# Patient Record
Sex: FEMALE | NOT HISPANIC OR LATINO | Employment: OTHER | URBAN - METROPOLITAN AREA
[De-identification: names, ages, dates, MRNs, and addresses within clinical notes are randomized per-mention and may not be internally consistent; named-entity substitution may affect disease eponyms.]

---

## 2019-01-02 ENCOUNTER — OFFICE VISIT CONVERTED (OUTPATIENT)
Dept: GASTROENTEROLOGY | Facility: CLINIC | Age: 69
End: 2019-01-02
Attending: NURSE PRACTITIONER

## 2019-01-02 ENCOUNTER — CONVERSION ENCOUNTER (OUTPATIENT)
Dept: GASTROENTEROLOGY | Facility: CLINIC | Age: 69
End: 2019-01-02

## 2021-05-15 VITALS
DIASTOLIC BLOOD PRESSURE: 63 MMHG | HEART RATE: 66 BPM | BODY MASS INDEX: 25.55 KG/M2 | SYSTOLIC BLOOD PRESSURE: 97 MMHG | HEIGHT: 66 IN | WEIGHT: 159 LBS | TEMPERATURE: 97.4 F

## 2024-01-05 ENCOUNTER — TELEPHONE (OUTPATIENT)
Dept: CARDIOLOGY | Facility: CLINIC | Age: 74
End: 2024-01-05

## 2024-01-05 NOTE — TELEPHONE ENCOUNTER
Caller: Michelle Cardona    Relationship: Self    Best call back number: [phone] (home)     What is the best time to reach you: ANY    Who are you requesting to speak with (clinical staff, provider,  specific staff member): ANY    Do you know the name of the person who called: NO MESSAGE WAS LEFT    What was the call regarding: PATIENT MISSED A CALL WITH NO VM, WANTED TO MAKE SURE HER APPOINTMENT WAS ALL SET, INFORMED HER IT WAS STILL FOR 1.9.24. PLEASE CALL HER BACK IF THERE WAS ANYTHING NEEDED BEFORE HER APPOINTMENT    Is it okay if the provider responds through MyChart: PLEASE CALL

## 2024-01-15 ENCOUNTER — OFFICE VISIT (OUTPATIENT)
Dept: CARDIOLOGY | Facility: CLINIC | Age: 74
End: 2024-01-15
Payer: MEDICARE

## 2024-01-15 VITALS
SYSTOLIC BLOOD PRESSURE: 128 MMHG | DIASTOLIC BLOOD PRESSURE: 72 MMHG | WEIGHT: 156 LBS | HEART RATE: 75 BPM | HEIGHT: 66 IN | BODY MASS INDEX: 25.07 KG/M2

## 2024-01-15 DIAGNOSIS — R01.1 HEART MURMUR: Primary | ICD-10-CM

## 2024-01-15 PROCEDURE — 99204 OFFICE O/P NEW MOD 45 MIN: CPT | Performed by: INTERNAL MEDICINE

## 2024-01-15 PROCEDURE — 1159F MED LIST DOCD IN RCRD: CPT | Performed by: INTERNAL MEDICINE

## 2024-01-15 PROCEDURE — 1160F RVW MEDS BY RX/DR IN RCRD: CPT | Performed by: INTERNAL MEDICINE

## 2024-01-15 RX ORDER — ASPIRIN 81 MG/1
81 TABLET ORAL DAILY
COMMUNITY

## 2024-01-15 RX ORDER — LEVOTHYROXINE SODIUM 0.05 MG/1
50 TABLET ORAL DAILY
COMMUNITY
Start: 2023-12-28

## 2024-01-15 RX ORDER — ATORVASTATIN CALCIUM 40 MG/1
40 TABLET, FILM COATED ORAL EVERY EVENING
COMMUNITY

## 2024-01-15 RX ORDER — CHOLECALCIFEROL (VITAMIN D3) 125 MCG
5 CAPSULE ORAL
COMMUNITY

## 2024-01-15 RX ORDER — CHOLECALCIFEROL (VITAMIN D3) 125 MCG
1 CAPSULE ORAL DAILY
COMMUNITY
Start: 2023-12-28

## 2024-01-15 RX ORDER — HYDROXYZINE HYDROCHLORIDE 25 MG/1
25 TABLET, FILM COATED ORAL DAILY
COMMUNITY
Start: 2023-12-12

## 2024-01-15 RX ORDER — OMEPRAZOLE 40 MG/1
1 CAPSULE, DELAYED RELEASE ORAL EVERY MORNING
COMMUNITY

## 2024-01-15 NOTE — PROGRESS NOTES
Chief Complaint  Heart Murmur and new patient    Subjective            Michelle Cardona presents to [facility] CARDIOLOGY  Heart Murmur  Pertinent negatives include no chest pain.       73-year-old white female.  She has a history of palpitations and short runs of SVT and was previously following with cardiology.  She has been in her usual state of health.  She saw primary care and had a systolic murmur.  This was also documented in previous cardiology visits back through 2019.  She has mild shortness of breath but otherwise no specific complaints currently.  She is very hard of hearing.  She denies palpitations or subjective tachycardia.    PMH  Past Medical History:   Diagnosis Date    Heart murmur          SURGICALHX  Past Surgical History:   Procedure Laterality Date    CARDIAC CATHETERIZATION          SOC  Social History     Socioeconomic History    Marital status:    Tobacco Use    Smoking status: Former     Types: Cigarettes    Smokeless tobacco: Never   Vaping Use    Vaping Use: Never used   Substance and Sexual Activity    Alcohol use: Yes     Comment: social    Drug use: Never    Sexual activity: Defer         FAMHX  Family History   Problem Relation Age of Onset    No Known Problems Mother     Heart attack Father     Heart attack Brother           ALLERGY  No Known Allergies     MEDSCURRENT    Current Outpatient Medications:     aspirin 81 MG EC tablet, Take 1 tablet by mouth Daily., Disp: , Rfl:     atorvastatin (LIPITOR) 40 MG tablet, Take 1 tablet by mouth Every Evening., Disp: , Rfl:     D-3-5 125 MCG (5000 UT) capsule capsule, Take 1 capsule by mouth Daily., Disp: , Rfl:     hydrOXYzine (ATARAX) 25 MG tablet, Take 1 tablet by mouth Daily., Disp: , Rfl:     levothyroxine (SYNTHROID, LEVOTHROID) 50 MCG tablet, Take 1 tablet by mouth Daily., Disp: , Rfl:     melatonin 5 MG tablet tablet, Take 1 tablet by mouth., Disp: , Rfl:     omeprazole (priLOSEC) 40 MG capsule, Take 1  "capsule by mouth Every Morning., Disp: , Rfl:       Review of Systems   Constitutional: Negative.   HENT: Negative.     Eyes: Negative.    Cardiovascular:  Positive for dyspnea on exertion. Negative for chest pain.   Respiratory:  Positive for shortness of breath.    Endocrine: Negative.    Hematologic/Lymphatic: Negative.    Skin: Negative.    Musculoskeletal: Negative.    Gastrointestinal: Negative.    Genitourinary: Negative.    Neurological: Negative.    Psychiatric/Behavioral: Negative.          Objective     /72   Pulse 75   Ht 167.6 cm (66\")   Wt 70.8 kg (156 lb)   BMI 25.18 kg/m²       General Appearance:   well developed  well nourished  HENT:   oropharynx moist  lips not cyanotic  Neck:  thyroid not enlarged  supple  Respiratory:  no respiratory distress  normal breath sounds  no rales  Cardiovascular:  no jugular venous distention  regular rhythm  apical impulse normal  S1 normal, S2 normal  no S3, no S4   Soft grade 2 parasternal systolic murmur  no rub, no thrill  carotid pulses normal; no bruit  pedal pulses normal  lower extremity edema: none    Musculoskeletal:  no clubbing of fingers.   normocephalic, head atraumatic  Skin:   warm, dry  Psychiatric:  judgement and insight appropriate  normal mood and affect      Result Review :             Data reviewed : Primary care records reviewed, laboratory studies reviewed      Procedures                Assessment and Plan        ASSESSMENT:  Encounter Diagnosis   Name Primary?    Heart murmur Yes         PLAN:    1.  Heart murmur-systolic on exam, relatively soft.  It has been documented previously in prior office visits.  She has not had any recent echo.  We will get an echo scheduled to evaluate for significant structural abnormalities.  We will discuss the diagnostic results when available.  No other workup is needed at this time.  2.  Continue current medical therapy, patient will otherwise be followed as needed pending echo " results      Patient was given instructions and counseling regarding her condition or for health maintenance advice. Please see specific information pulled into the AVS if appropriate.             C Goyo Hinojosa MD  1/15/2024    13:32 EST

## 2024-01-25 DIAGNOSIS — R01.1 HEART MURMUR: ICD-10-CM

## 2024-01-26 ENCOUNTER — TELEPHONE (OUTPATIENT)
Dept: CARDIOLOGY | Facility: CLINIC | Age: 74
End: 2024-01-26
Payer: MEDICARE

## 2024-01-29 ENCOUNTER — OFFICE VISIT (OUTPATIENT)
Dept: CARDIOLOGY | Facility: CLINIC | Age: 74
End: 2024-01-29
Payer: MEDICARE

## 2024-01-29 VITALS
HEIGHT: 66 IN | BODY MASS INDEX: 25.04 KG/M2 | DIASTOLIC BLOOD PRESSURE: 60 MMHG | SYSTOLIC BLOOD PRESSURE: 110 MMHG | OXYGEN SATURATION: 98 % | HEART RATE: 71 BPM | WEIGHT: 155.8 LBS

## 2024-01-29 DIAGNOSIS — R01.1 HEART MURMUR: Primary | ICD-10-CM

## 2024-01-29 DIAGNOSIS — I35.0 AORTIC VALVE STENOSIS, MODERATE: ICD-10-CM

## 2024-01-29 DIAGNOSIS — R00.2 PALPITATIONS: ICD-10-CM

## 2024-01-29 PROCEDURE — G2211 COMPLEX E/M VISIT ADD ON: HCPCS | Performed by: NURSE PRACTITIONER

## 2024-01-29 PROCEDURE — 99214 OFFICE O/P EST MOD 30 MIN: CPT | Performed by: NURSE PRACTITIONER

## 2024-01-29 PROCEDURE — 1159F MED LIST DOCD IN RCRD: CPT | Performed by: NURSE PRACTITIONER

## 2024-01-29 PROCEDURE — 1160F RVW MEDS BY RX/DR IN RCRD: CPT | Performed by: NURSE PRACTITIONER

## 2024-01-29 NOTE — PROGRESS NOTES
Chief Complaint  Heart Murmur and Follow-up (Testing )    Subjective            Michelle Cardona presents to [facility] CARDIOLOGY  History of Present Illness    Ms. Cardona is here for follow-up evaluation management of palpitations, short runs of SVT, systolic heart murmur.  Since her last visit, she had an echocardiogram completed which showed normal biventricular systolic function ejection fraction 60%.  Her aortic valve was moderately calcified and there was moderate to severe aortic stenosis.  She reports dyspnea at baseline.  She has some generalized weakness while walking with mild dizziness.  She denies syncope.  She is still able to do her normal activities for the most part.  She is very hard of hearing.  Her granddaughter is with her today.    PMH  Past Medical History:   Diagnosis Date    Heart murmur          SURGICALHX  Past Surgical History:   Procedure Laterality Date    CARDIAC CATHETERIZATION          SOC  Social History     Socioeconomic History    Marital status:    Tobacco Use    Smoking status: Former     Types: Cigarettes    Smokeless tobacco: Never   Vaping Use    Vaping Use: Never used   Substance and Sexual Activity    Alcohol use: Yes     Comment: social    Drug use: Never    Sexual activity: Defer         FAMHX  Family History   Problem Relation Age of Onset    No Known Problems Mother     Heart attack Father     Heart attack Brother           ALLERGY  No Known Allergies     MEDSCURRENT    Current Outpatient Medications:     aspirin 81 MG EC tablet, Take 1 tablet by mouth Daily., Disp: , Rfl:     atorvastatin (LIPITOR) 40 MG tablet, Take 1 tablet by mouth Every Evening., Disp: , Rfl:     D-3-5 125 MCG (5000 UT) capsule capsule, Take 1 capsule by mouth Daily., Disp: , Rfl:     hydrOXYzine (ATARAX) 25 MG tablet, Take 1 tablet by mouth Daily., Disp: , Rfl:     levothyroxine (SYNTHROID, LEVOTHROID) 50 MCG tablet, Take 1 tablet by mouth Daily., Disp: , Rfl:      "melatonin 5 MG tablet tablet, Take 1 tablet by mouth., Disp: , Rfl:     omeprazole (priLOSEC) 40 MG capsule, Take 1 capsule by mouth Every Morning., Disp: , Rfl:       Review of Systems   Constitutional: Negative for malaise/fatigue.   Cardiovascular:  Positive for near-syncope. Negative for chest pain, irregular heartbeat, palpitations and syncope.   Respiratory:  Positive for shortness of breath.    Neurological:  Positive for dizziness and weakness.        Objective     /60   Pulse 71   Ht 167.6 cm (66\")   Wt 70.7 kg (155 lb 12.8 oz)   SpO2 98%   BMI 25.15 kg/m²       General Appearance:   well developed  well nourished  HENT:   oropharynx moist  lips not cyanotic  Neck:  thyroid not enlarged  supple  Respiratory:  no respiratory distress  normal breath sounds  no rales  Cardiovascular:  no jugular venous distention  regular rhythm  apical impulse normal  S1 normal, S2 normal  no S3, no S4   Grade 2 parasternal systolic murmur  no rub, no thrill  carotid pulses normal; no bruit  pedal pulses normal  lower extremity edema: none    Musculoskeletal:  no clubbing of fingers.   normocephalic, head atraumatic  Skin:   warm, dry  Psychiatric:  judgement and insight appropriate  normal mood and affect      Result Review :     The following data was reviewed by: AMANDA Rebolledo on 01/29/2024:              Data reviewed : Cardiology studies echo reviewed as above.      Procedures      Michelle Cardona  reports that she has quit smoking. Her smoking use included cigarettes. She has never used smokeless tobacco.. I have educated her on the risk of diseases from using tobacco products such as cancer, COPD, and heart disease.              Assessment and Plan        ASSESSMENT:  Encounter Diagnoses   Name Primary?    Heart murmur Yes    Aortic valve stenosis, moderate     Palpitations          PLAN:    1.  Aortic valve stenosis-moderate to severe per echocardiogram.  Mean gradient of 36 mmHg, peak " velocity 4.1M/sec. she is having some weakness and mild dizziness.  She has not had syncope.  The symptoms are still relatively mild.  We discussed that if she has progression of the symptoms she will notify the office.  Otherwise I will see her back in another 6 months and we can repeat an echo if necessary at that time.  2.  Palpitations-short SVT.  Stable.  3.  Follow-up to be arranged.          Patient was given instructions and counseling regarding her condition or for health maintenance advice. Please see specific information pulled into the AVS if appropriate.           Glo Goff, APRN   1/29/2024  10:05 EST

## 2024-02-20 ENCOUNTER — TELEPHONE (OUTPATIENT)
Dept: CARDIOLOGY | Facility: CLINIC | Age: 74
End: 2024-02-20
Payer: MEDICARE

## 2024-02-20 NOTE — TELEPHONE ENCOUNTER
Caller: SEAN    Relationship: SELF    Best call back number: [phone]    What is the best time to reach you: ANY - PLEASE LEAVE MESSAGE IF NO ANSWER    Who are you requesting to speak with (clinical staff, provider,  specific staff member): CLINICAL     Do you know the name of the person who called:     What was the call regarding: PATIENT CALLING IN TO KNOW IF [facility] HAS TOO MUCH SALT. PATIENT STATES THEY CAN EAT 4 BAGS IN A WEEK. PER [facility] WEBSITE SODIUM INTAKE IS 310MG PER 6 PIECES. PLEASE CALL PATIENT TO ADVISE. THANK YOU!    Is it okay if the provider responds through [facility]: NO

## 2024-07-26 ENCOUNTER — OFFICE VISIT (OUTPATIENT)
Dept: CARDIOLOGY | Facility: CLINIC | Age: 74
End: 2024-07-26
Payer: MEDICARE

## 2024-07-26 VITALS
BODY MASS INDEX: 24.91 KG/M2 | DIASTOLIC BLOOD PRESSURE: 68 MMHG | HEART RATE: 82 BPM | SYSTOLIC BLOOD PRESSURE: 110 MMHG | HEIGHT: 66 IN | OXYGEN SATURATION: 93 % | WEIGHT: 155 LBS

## 2024-07-26 DIAGNOSIS — R00.2 PALPITATIONS: ICD-10-CM

## 2024-07-26 DIAGNOSIS — R06.09 DYSPNEA ON EXERTION: ICD-10-CM

## 2024-07-26 DIAGNOSIS — I35.0 AORTIC VALVE STENOSIS, MODERATE: Primary | ICD-10-CM

## 2024-07-26 NOTE — PROGRESS NOTES
"Chief Complaint  Heart Murmur, Follow-up (6 month ), Shortness of Breath, and Chest Pain    Subjective            Michelle Cardona presents to [facility] CARDIOLOGY  History of Present Illness    Ms. Cardona is here for follow-up evaluation management of moderate to severe aortic valve stenosis, palpitations, short runs of SVT.  She is very hard of hearing.  Since her last visit she is having more shortness of breath on exertion and \" giving out\" more easily.  She reports intermittent dizziness and presyncope.  No syncope.  She also reports chest pressure with exertion.    PMH  Past Medical History:   Diagnosis Date    Heart murmur          SURGICALHX  Past Surgical History:   Procedure Laterality Date    CARDIAC CATHETERIZATION          SOC  Social History     Socioeconomic History    Marital status:    Tobacco Use    Smoking status: Former     Types: Cigarettes    Smokeless tobacco: Never   Vaping Use    Vaping status: Never Used   Substance and Sexual Activity    Alcohol use: Yes     Comment: social    Drug use: Never    Sexual activity: Defer         FAMHX  Family History   Problem Relation Age of Onset    No Known Problems Mother     Heart attack Father     Heart attack Brother           ALLERGY  No Known Allergies     MEDSCURRENT    Current Outpatient Medications:     aspirin 81 MG EC tablet, Take 1 tablet by mouth Daily., Disp: , Rfl:     atorvastatin (LIPITOR) 40 MG tablet, Take 1 tablet by mouth Every Evening., Disp: , Rfl:     D-3-5 125 MCG (5000 UT) capsule capsule, Take 1 capsule by mouth Daily., Disp: , Rfl:     hydrOXYzine (ATARAX) 25 MG tablet, Take 1 tablet by mouth Daily., Disp: , Rfl:     levothyroxine (SYNTHROID, LEVOTHROID) 50 MCG tablet, Take 1 tablet by mouth Daily., Disp: , Rfl:     melatonin 5 MG tablet tablet, Take 1 tablet by mouth., Disp: , Rfl:     omeprazole (priLOSEC) 40 MG capsule, Take 1 capsule by mouth Every Morning., Disp: , Rfl:       Review of Systems " "  Cardiovascular:  Positive for chest pain, dyspnea on exertion and near-syncope. Negative for palpitations and syncope.   Neurological:  Positive for dizziness.        Objective     /68   Pulse 82   Ht 167.6 cm (66\")   Wt 70.3 kg (155 lb)   SpO2 93%   BMI 25.02 kg/m²       General Appearance:   well developed  well nourished  HENT:   oropharynx moist  lips not cyanotic  Neck:  thyroid not enlarged  supple  Respiratory:  no respiratory distress  normal breath sounds  no rales  Cardiovascular:  no jugular venous distention  regular rhythm  apical impulse normal  S1 normal, S2 normal  no S3, no S4   Harsh parasternal systolic murmur  no rub, no thrill  carotid pulses normal; no bruit  pedal pulses normal  lower extremity edema: none    Musculoskeletal:  no clubbing of fingers.   normocephalic, head atraumatic  Skin:   warm, dry  Psychiatric:  judgement and insight appropriate  normal mood and affect      Result Review :     The following data was reviewed by: AMANDA Rebolledo on 07/26/2024:              Data reviewed : Cardiology studies previous echo reviewed showing aortic valve mean gradient of 36 mmHg, peak velocity 4.1M/sec.      Procedures      Michelle Cardona  reports that she has quit smoking. Her smoking use included cigarettes. She has never used smokeless tobacco. I have educated her on the risk of diseases from using tobacco products such as cancer, COPD, and heart disease.              Assessment and Plan        ASSESSMENT:  Encounter Diagnoses   Name Primary?    Aortic valve stenosis, moderate Yes    Palpitations     Dyspnea on exertion          PLAN:    1.  Moderate to severe aortic valve stenosis, she has had more shortness of breath and fatigue over the last several weeks.  Will get an expedited echo to look at any progression of her aortic valve stenosis.  Will discuss findings once that is completed.  2.  Palpitations-Short SVT, stable.  3.  Dyspnea on exertion as " above.    Follow-up after echo.      Patient was given instructions and counseling regarding her condition or for health maintenance advice. Please see specific information pulled into the AVS if appropriate.           Glo Goff, APRN   7/26/2024  12:20 EDT

## 2024-08-09 DIAGNOSIS — I35.0 AORTIC VALVE STENOSIS, MODERATE: ICD-10-CM

## 2024-08-15 ENCOUNTER — PREP FOR SURGERY (OUTPATIENT)
Dept: OTHER | Facility: HOSPITAL | Age: 74
End: 2024-08-15
Payer: MEDICARE

## 2024-08-15 DIAGNOSIS — I35.0 AORTIC STENOSIS, SEVERE: Primary | ICD-10-CM

## 2024-08-15 RX ORDER — SODIUM CHLORIDE 0.9 % (FLUSH) 0.9 %
10 SYRINGE (ML) INJECTION AS NEEDED
OUTPATIENT
Start: 2024-08-15

## 2024-08-15 RX ORDER — SODIUM CHLORIDE 0.9 % (FLUSH) 0.9 %
10 SYRINGE (ML) INJECTION EVERY 12 HOURS SCHEDULED
OUTPATIENT
Start: 2024-08-15

## 2024-08-15 RX ORDER — SODIUM CHLORIDE 9 MG/ML
40 INJECTION, SOLUTION INTRAVENOUS AS NEEDED
OUTPATIENT
Start: 2024-08-15

## 2024-08-16 ENCOUNTER — TELEPHONE (OUTPATIENT)
Dept: CARDIOLOGY | Facility: CLINIC | Age: 74
End: 2024-08-16
Payer: MEDICARE

## 2024-08-16 PROBLEM — I35.0 AORTIC STENOSIS, SEVERE: Status: ACTIVE | Noted: 2024-08-15

## 2024-08-16 NOTE — TELEPHONE ENCOUNTER
"  Caller: arunamy    Relationship: Emergency Contact    Best call back number: [phone]     What is the best time to reach you: ANYTIME    Who are you requesting to speak with (clinical staff, provider,  specific staff member): ANY    Do you know the name of the person who called: AMY    What was the call regarding: AMY CALLED REQUESTING THE DATE AND TIME FOR THE PT'S UPCOMING HEART CATH. AMY IS ON THE PT'S VERBAL BUT NOTHING IS CHECKED OFF TO GIVE ME ANY PERMISSION TO RELEASE THIS INFORMATION TO HER. PT WAS NOT PRESENT TO GIVE PERMISSION AND AMY STATED THAT SHE COULD NOT GET PT ON THE LINE LATER. AMY GOT UPSET AND REQUESTED I GET A MESSAGE TO \"SOMEONE WHO COULD TELL HER\" THIS INFORMATION. AMY STATES SHE IS TH EPT'S TRANSPORTATION  "

## 2024-08-23 ENCOUNTER — TELEPHONE (OUTPATIENT)
Dept: CARDIOLOGY | Facility: CLINIC | Age: 74
End: 2024-08-23
Payer: MEDICARE

## 2024-08-23 NOTE — TELEPHONE ENCOUNTER
I spoke to patient and gave an arrival time of 7:30 on 08/29/24 for [facility]. Patient was instructed to have a  for the day of the procedure and to arrive at the [facility] registration area. Patient was educated about stent placement and informed that in the event of stent placement, the patient will be required to stay overnight for observation. Patient was instructed to continue all medications as usual. Patient was instructed to be NPO after midnight with only sips of water as needed. Patient was instructed to have labs completed on the morning of the procedure. Patient is agreeable with no other questions or concerns.

## 2024-08-29 ENCOUNTER — HOSPITAL ENCOUNTER (OUTPATIENT)
Facility: HOSPITAL | Age: 74
Setting detail: HOSPITAL OUTPATIENT SURGERY
Discharge: HOME | End: 2024-08-29
Attending: INTERNAL MEDICINE | Admitting: INTERNAL MEDICINE
Payer: MEDICARE

## 2024-08-29 VITALS
TEMPERATURE: 98.7 F | SYSTOLIC BLOOD PRESSURE: 105 MMHG | OXYGEN SATURATION: 95 % | HEART RATE: 78 BPM | DIASTOLIC BLOOD PRESSURE: 56 MMHG | RESPIRATION RATE: 16 BRPM | WEIGHT: 157.19 LBS | BODY MASS INDEX: 25.26 KG/M2 | HEIGHT: 66 IN

## 2024-08-29 DIAGNOSIS — I35.0 AORTIC STENOSIS, SEVERE: ICD-10-CM

## 2024-08-29 LAB
ANION GAP SERPL CALCULATED.3IONS-SCNC: 9.9 MMOL/L (ref 5–15)
BUN SERPL-MCNC: 12 MG/DL (ref 8–23)
BUN/CREAT SERPL: 12.6 (ref 7–25)
CALCIUM SPEC-SCNC: 9.7 MG/DL (ref 8.6–10.5)
CHLORIDE SERPL-SCNC: 107 MMOL/L (ref 98–107)
CO2 SERPL-SCNC: 25.1 MMOL/L (ref 22–29)
CREAT SERPL-MCNC: 0.95 MG/DL (ref 0.57–1)
DEPRECATED RDW RBC AUTO: 43.9 FL (ref 37–54)
EGFRCR SERPLBLD CKD-EPI 2021: 63.8 ML/MIN/1.73
ERYTHROCYTE [DISTWIDTH] IN BLOOD BY AUTOMATED COUNT: 16.9 % (ref 12.3–15.4)
GLUCOSE SERPL-MCNC: 90 MG/DL (ref 65–99)
HCT VFR BLD AUTO: 31.3 % (ref 34–46.6)
HGB BLD-MCNC: 9.1 G/DL (ref 12–15.9)
INR PPP: 1.09 (ref 0.86–1.15)
MCH RBC QN AUTO: 21.3 PG (ref 26.6–33)
MCHC RBC AUTO-ENTMCNC: 29.1 G/DL (ref 31.5–35.7)
MCV RBC AUTO: 73.3 FL (ref 79–97)
PLATELET # BLD AUTO: 276 10*3/MM3 (ref 140–450)
PMV BLD AUTO: 10.3 FL (ref 6–12)
POTASSIUM SERPL-SCNC: 4 MMOL/L (ref 3.5–5.2)
PROTHROMBIN TIME: 14.3 SECONDS (ref 11.8–14.9)
RBC # BLD AUTO: 4.27 10*6/MM3 (ref 3.77–5.28)
SODIUM SERPL-SCNC: 142 MMOL/L (ref 136–145)
WBC NRBC COR # BLD AUTO: 5.46 10*3/MM3 (ref 3.4–10.8)

## 2024-08-29 PROCEDURE — 25010000002 MIDAZOLAM PER 1MG: Performed by: INTERNAL MEDICINE

## 2024-08-29 PROCEDURE — C1769 GUIDE WIRE: HCPCS | Performed by: INTERNAL MEDICINE

## 2024-08-29 PROCEDURE — 99152 MOD SED SAME PHYS/QHP 5/>YRS: CPT | Performed by: INTERNAL MEDICINE

## 2024-08-29 PROCEDURE — 25010000002 HEPARIN (PORCINE) PER 1000 UNITS: Performed by: INTERNAL MEDICINE

## 2024-08-29 PROCEDURE — 93458 L HRT ARTERY/VENTRICLE ANGIO: CPT | Performed by: INTERNAL MEDICINE

## 2024-08-29 PROCEDURE — C1894 INTRO/SHEATH, NON-LASER: HCPCS | Performed by: INTERNAL MEDICINE

## 2024-08-29 PROCEDURE — 85027 COMPLETE CBC AUTOMATED: CPT | Performed by: INTERNAL MEDICINE

## 2024-08-29 PROCEDURE — 25510000001 IOPAMIDOL PER 1 ML: Performed by: INTERNAL MEDICINE

## 2024-08-29 PROCEDURE — 25010000002 FENTANYL CITRATE (PF) 50 MCG/ML SOLUTION: Performed by: INTERNAL MEDICINE

## 2024-08-29 PROCEDURE — 80048 BASIC METABOLIC PNL TOTAL CA: CPT | Performed by: INTERNAL MEDICINE

## 2024-08-29 PROCEDURE — 85610 PROTHROMBIN TIME: CPT | Performed by: INTERNAL MEDICINE

## 2024-08-29 RX ORDER — SODIUM CHLORIDE 9 MG/ML
40 INJECTION, SOLUTION INTRAVENOUS AS NEEDED
Status: DISCONTINUED | OUTPATIENT
Start: 2024-08-29 | End: 2024-08-29 | Stop reason: HOSPADM

## 2024-08-29 RX ORDER — VERAPAMIL HYDROCHLORIDE 2.5 MG/ML
INJECTION, SOLUTION INTRAVENOUS
Status: DISCONTINUED | OUTPATIENT
Start: 2024-08-29 | End: 2024-08-29 | Stop reason: HOSPADM

## 2024-08-29 RX ORDER — HEPARIN SODIUM 1000 [USP'U]/ML
INJECTION, SOLUTION INTRAVENOUS; SUBCUTANEOUS
Status: DISCONTINUED | OUTPATIENT
Start: 2024-08-29 | End: 2024-08-29 | Stop reason: HOSPADM

## 2024-08-29 RX ORDER — MIDAZOLAM HYDROCHLORIDE 2 MG/2ML
INJECTION, SOLUTION INTRAMUSCULAR; INTRAVENOUS
Status: DISCONTINUED | OUTPATIENT
Start: 2024-08-29 | End: 2024-08-29 | Stop reason: HOSPADM

## 2024-08-29 RX ORDER — FENTANYL CITRATE 50 UG/ML
INJECTION, SOLUTION INTRAMUSCULAR; INTRAVENOUS
Status: DISCONTINUED | OUTPATIENT
Start: 2024-08-29 | End: 2024-08-29 | Stop reason: HOSPADM

## 2024-08-29 RX ORDER — NITROGLYCERIN 0.4 MG/1
0.4 TABLET SUBLINGUAL
Status: CANCELLED | OUTPATIENT
Start: 2024-08-29

## 2024-08-29 RX ORDER — ACETAMINOPHEN 325 MG/1
650 TABLET ORAL EVERY 4 HOURS PRN
Status: CANCELLED | OUTPATIENT
Start: 2024-08-29

## 2024-08-29 RX ORDER — IOPAMIDOL 755 MG/ML
INJECTION, SOLUTION INTRAVASCULAR
Status: DISCONTINUED | OUTPATIENT
Start: 2024-08-29 | End: 2024-08-29 | Stop reason: HOSPADM

## 2024-08-29 RX ORDER — SODIUM CHLORIDE 0.9 % (FLUSH) 0.9 %
10 SYRINGE (ML) INJECTION AS NEEDED
Status: DISCONTINUED | OUTPATIENT
Start: 2024-08-29 | End: 2024-08-29 | Stop reason: HOSPADM

## 2024-08-29 RX ORDER — SODIUM CHLORIDE 0.9 % (FLUSH) 0.9 %
10 SYRINGE (ML) INJECTION EVERY 12 HOURS SCHEDULED
Status: DISCONTINUED | OUTPATIENT
Start: 2024-08-29 | End: 2024-08-29 | Stop reason: HOSPADM

## 2024-08-29 RX ORDER — LIDOCAINE HYDROCHLORIDE 20 MG/ML
INJECTION, SOLUTION INFILTRATION; PERINEURAL
Status: DISCONTINUED | OUTPATIENT
Start: 2024-08-29 | End: 2024-08-29 | Stop reason: HOSPADM

## 2024-08-29 NOTE — H&P
"Chief Complaint  No chief complaint on file.    Subjective            Michelle Cardona presents to [facility] PRE OP PHASE II  History of Present Illness    Ms. Cardona is here for follow-up evaluation management of moderate to severe aortic valve stenosis.  She is here for elective cardiac catheterization to determine coronary anatomy before referral to structural heart clinic.    PMH  Past Medical History:   Diagnosis Date    Disease of thyroid gland     GERD (gastroesophageal reflux disease)     Heart murmur          SURGICALHX  Past Surgical History:   Procedure Laterality Date    CARDIAC CATHETERIZATION      COLONOSCOPY          SOC  Social History     Socioeconomic History    Marital status:    Tobacco Use    Smoking status: Former     Types: Cigarettes    Smokeless tobacco: Never   Vaping Use    Vaping status: Never Used   Substance and Sexual Activity    Alcohol use: Yes     Comment: social    Drug use: Never    Sexual activity: Defer         FAMHX  Family History   Problem Relation Age of Onset    No Known Problems Mother     Heart attack Father     Heart attack Brother     Malig Hyperthermia Neg Hx           ALLERGY  No Known Allergies     MEDSCURRENT    Current Facility-Administered Medications:     sodium chloride 0.9 % bolus 210.9 mL, 3 mL/kg, Intravenous, Once Over 1 Hour, BAY Hinojosa MD    sodium chloride 0.9 % flush 10 mL, 10 mL, Intravenous, Q12H, BAY Hinojosa MD    sodium chloride 0.9 % flush 10 mL, 10 mL, Intravenous, PRN, BAY Hinojosa MD    sodium chloride 0.9 % infusion 40 mL, 40 mL, Intravenous, PRN, BAY Hinojosa MD      Review of Systems   Cardiovascular:  Positive for chest pain, dyspnea on exertion and near-syncope. Negative for palpitations and syncope.   Neurological:  Positive for dizziness.        Objective     /71 (BP Location: Right arm, Patient Position: Lying)   Pulse 68   Temp 98.4 °F (36.9 °C) (Temporal)   Resp 16   Ht 167.6 cm (66\")   " Wt 71.3 kg (157 lb 3 oz)   SpO2 97%   BMI 25.37 kg/m²       General Appearance:   well developed  well nourished  HENT:   oropharynx moist  lips not cyanotic  Neck:  thyroid not enlarged  supple  Respiratory:  no respiratory distress  normal breath sounds  no rales  Cardiovascular:  no jugular venous distention  regular rhythm  apical impulse normal  S1 normal, S2 normal  no S3, no S4   Harsh parasternal systolic murmur  no rub, no thrill  carotid pulses normal; no bruit  pedal pulses normal  lower extremity edema: none          Result Review :     The following data was reviewed by: Peter Hinojosa MD on 07/26/2024:    CMP          8/29/2024    08:28   CMP   Glucose 90    BUN 12    Creatinine 0.95    EGFR 63.8    Sodium 142    Potassium 4.0    Chloride 107    Calcium 9.7    BUN/Creatinine Ratio 12.6    Anion Gap 9.9      CBC          8/29/2024    08:28   CBC   WBC 5.46    RBC 4.27    Hemoglobin 9.1    Hematocrit 31.3    MCV 73.3    MCH 21.3    MCHC 29.1    RDW 16.9    Platelets 276            Data reviewed : Recent echo reviewed showing severe aortic valve stenosis      Procedures           Assessment and Plan        ASSESSMENT:  Encounter Diagnoses   Name Primary?    Severe aortic stenosis Yes         PLAN:    1.  Coronary angiography today to define coronary anatomy before referring to structural heart clinic.  Will defer discussion regarding surgical AVR versus TAVR to the structural heart clinic              Peter Hinojosa MD   8/29/2024  11:55 EDT

## 2024-08-29 NOTE — DISCHARGE INSTRUCTIONS
For your surgery you had:  IV sedation.     You may experience dizziness, drowsiness, or light-headedness for several hours following surgery/procedure.  Do not stay alone today or tonight.  Limit your activity for 24 hours.  Resume your diet slowly.  Follow whatever special dietary instructions you may have been given by your doctor.  You should not drive or operate machinery, drink alcohol, or sign legally binding documents for 24 hours or while you are taking pain medication.    NOTIFY YOUR DOCTOR IF YOU EXPERIENCE ANY OF THE FOLLOWING:  Temperature greater than 101 degrees Fahrenheit  Shaking Chills  Redness or excessive drainage from incision  Nausea, vomiting and/or pain that is not controlled by prescribed medications  Increase in bleeding or bleeding that is excessive  Unable to urinate in 6 hours after surgery  If unable to reach your doctor, please go to the closest Emergency Room    Medications per physician instructions as indicated on After Visit Summary.    Resume your metformin in 48 hours    [x] TR BAND DISCHARGE INSTRUCTIONS:  For 24 hours after the procedure, or as directed by your health care provider:  Do not flex or bend the affected arm.  Do not push or pull heavy objects with the affected arm.  Do not operate machinery or power tools.  Keep affected arm elevated and rested for 48 hours.  Do not apply powder or lotion to the site.  Check your incision site every day for signs of infection. Check for:  Redness, swelling, or pain.  Fluid or blood.  Warmth.  Pus or a bad smell.  May remove bandaid: in 24 hours  Avoid manipulation of the wrist for 24 hours  After the dressing is removed, clean gently with soap and water, apply new dressing.   Avoid submerging site for one week or until healed.

## 2024-09-06 ENCOUNTER — TELEPHONE (OUTPATIENT)
Dept: CARDIOLOGY | Facility: HOSPITAL | Age: 74
End: 2024-09-06
Payer: MEDICARE

## 2024-09-06 NOTE — TELEPHONE ENCOUNTER
I spoke with Ms Contreras patients daughter and introduced the structural heart program We discussed aortic stenosis and the evaluation process. Ms Cardona is agreeable to starting the evaluation with a visit with Dr Montano at the [facility] 9/12/24. I have mailed them our introductory packet including information on shared decision making I have also provided them our contact information and encouraged them to call with any further questions

## 2024-09-12 ENCOUNTER — TELEPHONE (OUTPATIENT)
Dept: CARDIOLOGY | Facility: HOSPITAL | Age: 74
End: 2024-09-12
Payer: MEDICARE

## 2024-09-12 ENCOUNTER — HOSPITAL ENCOUNTER (OUTPATIENT)
Dept: CARDIOLOGY | Facility: HOSPITAL | Age: 74
Discharge: HOME | End: 2024-09-12

## 2024-09-12 ENCOUNTER — OFFICE VISIT (OUTPATIENT)
Age: 74
End: 2024-09-12
Payer: MEDICARE

## 2024-09-12 VITALS
WEIGHT: 159 LBS | DIASTOLIC BLOOD PRESSURE: 62 MMHG | SYSTOLIC BLOOD PRESSURE: 102 MMHG | HEIGHT: 66 IN | BODY MASS INDEX: 25.55 KG/M2 | HEART RATE: 72 BPM

## 2024-09-12 DIAGNOSIS — I35.0 NONRHEUMATIC AORTIC VALVE STENOSIS: Primary | ICD-10-CM

## 2024-09-12 DIAGNOSIS — I35.0 AORTIC VALVE STENOSIS, SEVERE: ICD-10-CM

## 2024-09-12 DIAGNOSIS — I50.32 CHRONIC DIASTOLIC CONGESTIVE HEART FAILURE: ICD-10-CM

## 2024-09-12 DIAGNOSIS — Z09 FOLLOW-UP EXAM: ICD-10-CM

## 2024-09-12 NOTE — TELEPHONE ENCOUNTER
After Ms Cardona was seen in office with Dr Montano the following appointments were scheduled 9/25 at 10:45 a TAVR CTA has been scheduled , an appointment with Dr Schumacher has been scheduled for 12:30 I left a message with this information and our contact information asking her to call us and confirm

## 2024-09-12 NOTE — PROGRESS NOTES
[facility] Structural Cardiology New Patient Office Note  [facility]     Encounter Date:24  Patient:Michelle Cardona  :1950  MRN:[id number]    Referring Provider: Goyo Hinojosa MD    Consulted for: Aortic stenosis    Chief Complaint:   Chief Complaint   Patient presents with    Aortic stenosis, severe       History of Presenting Illness:      Ms. Cardona is a 73 y.o. woman with past medical history notable for non-rheumatic aortic stenosis and hypothyroidism who presents for an initial evaluation for her aortic stenosis.  Overall patient has been having worsening shortness of breath and dyspnea exertion over the last year or so.  About 6 months ago symptoms were mild in severity her echocardiogram at that time was concerning for moderate to severe aortic stenosis but symptoms were not particularly bothersome for her she elected to continue to monitor them.  Over the last 6 months however they have progressed she has had worsening shortness of breath and dyspnea on exertion some lightheadedness and dizziness.  She denies any overt syncope chest pains or symptoms at rest.  Outside of her valve disease she is otherwise fairly healthy unfortunately she is somewhat frail extraordinarily hard of hearing and also has extremely poor vision.  She is accompanied today by her daughters      Review of Systems:  Review of Systems   Constitutional: Positive for malaise/fatigue.   HENT: Negative.     Eyes: Negative.    Cardiovascular:  Positive for dyspnea on exertion.   Respiratory:  Positive for shortness of breath.    Endocrine: Negative.    Hematologic/Lymphatic: Negative.    Skin: Negative.    Musculoskeletal: Negative.    Gastrointestinal: Negative.    Genitourinary: Negative.    Neurological: Negative.    Psychiatric/Behavioral: Negative.     Allergic/Immunologic: Negative.        Current Outpatient Medications on File Prior to Visit   Medication Sig Dispense Refill    aspirin 81 MG EC tablet  Take 1 tablet by mouth Daily.      atorvastatin (LIPITOR) 40 MG tablet Take 1 tablet by mouth Every Evening.      D-3-5 125 MCG (5000 UT) capsule capsule Take 1 capsule by mouth Daily.      hydrOXYzine (ATARAX) 25 MG tablet Take 1 tablet by mouth Daily.      levothyroxine (SYNTHROID, LEVOTHROID) 50 MCG tablet Take 1 tablet by mouth Daily.      melatonin 5 MG tablet tablet Take 1 tablet by mouth.      omeprazole (priLOSEC) 40 MG capsule Take 1 capsule by mouth Every Morning.       No current facility-administered medications on file prior to visit.       No Known Allergies    Past Medical History:   Diagnosis Date    Disease of thyroid gland     GERD (gastroesophageal reflux disease)     Heart murmur        Past Surgical History:   Procedure Laterality Date    CARDIAC CATHETERIZATION      CARDIAC CATHETERIZATION N/A 8/29/2024    Procedure: Left Heart Cath;  Surgeon: BAY Hinojosa MD;  Location: [facility] CATH INVASIVE LOCATION;  Service: Cardiology;  Laterality: N/A;    CARDIAC CATHETERIZATION Left 8/29/2024    Procedure: Cardiac Catheterization/ coronaries ONLY;  Surgeon: BAY Hinojosa MD;  Location: [facility] CATH INVASIVE LOCATION;  Service: Cardiology;  Laterality: Left;    COLONOSCOPY         Social History     Socioeconomic History    Marital status:    Tobacco Use    Smoking status: Former     Types: Cigarettes    Smokeless tobacco: Never   Vaping Use    Vaping status: Never Used   Substance and Sexual Activity    Alcohol use: Yes     Comment: social    Drug use: Never    Sexual activity: Defer       Family History   Problem Relation Age of Onset    No Known Problems Mother     Heart attack Father     Heart attack Brother     Malig Hyperthermia Neg Hx        The following portions of the patient's history were reviewed and updated as appropriate: allergies, current medications, past family history, past medical history, past social history, past surgical history and problem list.       Objective:      "  Vitals:    09/12/24 1116   BP: 102/62   BP Location: Left arm   Patient Position: Sitting   Pulse: 72   Weight: 72.1 kg (159 lb)   Height: 167.6 cm (66\")       Body mass index is 25.66 kg/m².    Physical Exam:  Constitutional: Well appearing, Well-developed, No acute distress   HENT: Oropharynx clear and membrane moist  Eyes: Normal conjunctiva, no sclera icterus.  Neck: Supple, no carotid bruit bilaterally.  Cardiovascular: Regular rate and rhythm, Late peaking systolic murmur over the right upper sternal border, No bilateral lower extremity edema.  Pulmonary: Normal respiratory effort, Normal lung sounds, no wheezing.  Neurological: Alert and orient x 3.   Skin: Warm, dry, no ecchymosis, no rash.  Psych: Appropriate mood and affect. Normal judgment and insight.      Lab Results   Component Value Date     08/29/2024    K 4.0 08/29/2024     08/29/2024    CO2 25.1 08/29/2024    BUN 12 08/29/2024    CREATININE 0.95 08/29/2024    GLUCOSE 90 08/29/2024    CALCIUM 9.7 08/29/2024     Lab Results   Component Value Date    WBC 5.46 08/29/2024    HGB 9.1 (L) 08/29/2024    HCT 31.3 (L) 08/29/2024    MCV 73.3 (L) 08/29/2024     08/29/2024     No results found for: \"CHOL\", \"TRIG\", \"HDL\", \"LDL\"  No results found for: \"PROBNP\", \"BNP\"  No results found for: \"CKTOTAL\", \"CKMB\", \"CKMBINDEX\", \"TROPONINI\", \"TROPONINT\"  No results found for: \"TSH\"      ECG 12 Lead    Date/Time: 9/12/2024 12:16 PM  Performed by: George Montano MD    Authorized by: George Montano MD  Comparison: compared with previous ECG from 2/28/2023  Similar to previous ECG  Rhythm: sinus rhythm  Other findings: left ventricular hypertrophy with strain          Cardiac Catheterization 8/29/2024:  Left main-large, angiographically normal, bifurcates into the LAD and circumflex  LAD-large, wraps the apex, mild luminal irregularities throughout, no focal stenosis  Left circumflex-large, dominant, mild luminal regularities  RCA-small, mild " nonobstructive disease     Echocardiogram 8/12/2024 [facility]:  Normal EF 55%  Severe aortic stenosis with mean gradient of 73 mmHg and peak velocity of 5.4 m/s    Echocardiogram 1/25/2024 [facility]:  Normal EF 55%  Moderate to severe aortic stenosis with mean gradient of 38 mmHg and peak velocity of 4.0 m/s          Assessment:          Diagnosis Plan   1. Nonrheumatic aortic valve stenosis  ECG 12 Lead             Plan:       Ms. Cardona is a 73 y.o. woman with past medical history notable for non-rheumatic aortic stenosis and hypothyroidism who presents for an initial evaluation for her aortic stenosis.  Overall patient's symptoms have been progressive over the last 6 months.  Her most recent echocardiogram gradients are high and in fact fallen to the critical range I think getting her valve addressed urgently would be appropriate.  Images of her most recent echocardiogram not available but we will work on getting those available to us.  We did discuss treatment options including TAVR.  This would be a reasonable option but further delineation with a CTA would be appropriate to understand her aortic root anatomy and access points to implant her valve.  She is not terribly old however she is pretty frail on my assessment we will also have our surgeons see her as well and I think if she is got good aortic root anatomy and femoral access it makes sense to proceed forward with TAVR but obviously would want our surgery colleagues input as well and I think having an understanding of her aortic root anatomy would be helpful.  Obviously if a hostile aortic root or limited access points that might change best treatment options for her to open surgery.    Aortic stenosis:  Symptomatic aortic stenosis concerning for critical aortic stenosis based upon change in gradients of her aortic valve over the last 6 months  She does not have any high risk features but where her valve gradients are is concerning  for critical aortic stenosis  Further evaluation with our surgical team and CTA  Will work on getting images of her echocardiogram from [place] as well      Follow up:  After surgical evaluation and CTA      Thank you for allowing me to participate in the care of Michelle Cardona. Feel free to contact me directly with any further questions or concerns.    George Montano MD  [facility]  09/12/24  12:22 EDT       Aortic Stenosis education material has been provided to the patient. This included a printed brochure detailing pathophysiology of aortic stenosis, patient specific aortic stenosis symptoms, and treatment options (medical therapy, surgical aortic valve replacement, and transcatheter aortic valve replacement).   We reviewed the Shared Decision Making Tool for treatment of Aortic Stenosis to compare/contrast the options of TAVR/SAVR/medical management. We discussed patient's goals of care: Feel better. Patient has expressed following concerns regarding Aortic Stenosis and/or treatment options: Get her aortic valve treated the best way possible  Patient has a Living Will: no  Patient has a Power of : no      Addendum:  Was able to look at PowerShare images from [facility].  Unfortunate LVOT VTI was not obtained but max velocity was.  The LVOT diameter was Ms. measured it was measured at 1.1 cm giving a valve area of 0.2.  When I assume a LVOT diameter of 2 cm which would be a rough estimate of what were dealing with valve area calculates out to 0.5 cm2 consistent with critical aortic stenosis based upon the appearance and gradients and tightness of the valve like we will need a BAV at the time of valve implant.

## 2024-09-12 NOTE — TELEPHONE ENCOUNTER
I spoke with Ms Contreras and confirmed she will bring her Mom to [place] 9/25 for both appointments

## 2024-09-24 ENCOUNTER — TELEPHONE (OUTPATIENT)
Dept: CARDIOLOGY | Facility: HOSPITAL | Age: 74
End: 2024-09-24
Payer: MEDICARE

## 2024-09-27 ENCOUNTER — DOCUMENTATION (OUTPATIENT)
Dept: CARDIOLOGY | Facility: HOSPITAL | Age: 74
End: 2024-09-27
Payer: MEDICARE

## 2024-09-27 ENCOUNTER — HOSPITAL ENCOUNTER (OUTPATIENT)
Dept: CT IMAGING | Facility: HOSPITAL | Age: 74
Discharge: HOME | End: 2024-09-27
Payer: MEDICARE

## 2024-09-27 ENCOUNTER — OFFICE VISIT (OUTPATIENT)
Dept: CARDIAC SURGERY | Facility: CLINIC | Age: 74
End: 2024-09-27
Payer: MEDICARE

## 2024-09-27 VITALS
SYSTOLIC BLOOD PRESSURE: 120 MMHG | TEMPERATURE: 97.8 F | HEART RATE: 82 BPM | OXYGEN SATURATION: 96 % | WEIGHT: 159 LBS | BODY MASS INDEX: 25.55 KG/M2 | HEIGHT: 66 IN | DIASTOLIC BLOOD PRESSURE: 73 MMHG | RESPIRATION RATE: 18 BRPM

## 2024-09-27 VITALS — HEART RATE: 70 BPM

## 2024-09-27 DIAGNOSIS — I35.0 NONRHEUMATIC AORTIC VALVE STENOSIS: Primary | ICD-10-CM

## 2024-09-27 DIAGNOSIS — I35.0 AORTIC VALVE STENOSIS, SEVERE: ICD-10-CM

## 2024-09-27 DIAGNOSIS — I50.32 CHRONIC DIASTOLIC CONGESTIVE HEART FAILURE: ICD-10-CM

## 2024-09-27 DIAGNOSIS — I35.0 NONRHEUMATIC AORTIC VALVE STENOSIS: ICD-10-CM

## 2024-09-27 LAB — CREAT BLDA-MCNC: 1 MG/DL (ref 0.6–1.3)

## 2024-09-27 PROCEDURE — 82565 ASSAY OF CREATININE: CPT

## 2024-09-27 PROCEDURE — 71275 CT ANGIOGRAPHY CHEST: CPT

## 2024-09-27 PROCEDURE — 25510000001 IOPAMIDOL PER 1 ML: Performed by: INTERNAL MEDICINE

## 2024-09-27 PROCEDURE — 74174 CTA ABD&PLVS W/CONTRAST: CPT

## 2024-09-27 RX ORDER — IOPAMIDOL 755 MG/ML
100 INJECTION, SOLUTION INTRAVASCULAR
Status: COMPLETED | OUTPATIENT
Start: 2024-09-27 | End: 2024-09-27

## 2024-09-27 RX ADMIN — IOPAMIDOL 95 ML: 755 INJECTION, SOLUTION INTRAVENOUS at 11:21

## 2024-09-27 NOTE — H&P (VIEW-ONLY)
"Chief Complaint  Aortic Stenosis    Subjective        Michelle Cardona presents to [facility] CARDIAC SURGERY  History of Present Illness  73 years old female with hypothyroidism, aortic valve disease, decreased hearing, comes for evaluation about her aortic valve stenosis.  He has shortness of breath on exertion over the last 4 to 5 months.  She also has some dizziness that is new for her.  The symptoms are progressing.  Last echocardiogram on 8/8/2024 showed severe aortic valve stenosis with a peak velocity of 5.4 m/s and a mean gradient of 74 mmHg.  Cardiac cath on 8/29/2024 showed nonobstructive coronary artery disease.      Objective   Vital Signs:  /73 (BP Location: Right arm, Patient Position: Sitting, Cuff Size: Adult)   Pulse 82   Temp 97.8 °F (36.6 °C)   Resp 18   Ht 167.6 cm (66\")   Wt 72.1 kg (159 lb)   SpO2 96%   BMI 25.66 kg/m²   Estimated body mass index is 25.66 kg/m² as calculated from the following:    Height as of this encounter: 167.6 cm (66\").    Weight as of this encounter: 72.1 kg (159 lb).            Physical Exam   Result Review :                Assessment and Plan   There are no diagnoses linked to this encounter.    -Severe aortic valve stenosis.  For TAVR    73 years old female with severe symptomatic aortic valve stenosis.  She has critical AS and I think that needs to be treated soon.  Due to age and she looks frail I think that the best option to treat her aortic valve is TAVR.  If TAVR is high risk due to a bicuspid valve or any other abnormal findings in the TAVR CTA I will offer her open heart surgery.  I explained risk and benefits of the procedure to the patient and family and she agreed to proceed.  She will be an open rescue if needed.       Follow Up   No follow-ups on file.  Patient was given instructions and counseling regarding her condition or for health maintenance advice. Please see specific information pulled into the AVS if appropriate. "

## 2024-09-30 DIAGNOSIS — I35.0 AORTIC VALVE STENOSIS, SEVERE: Primary | ICD-10-CM

## 2024-10-01 ENCOUNTER — PREP FOR SURGERY (OUTPATIENT)
Dept: OTHER | Facility: HOSPITAL | Age: 74
End: 2024-10-01
Payer: MEDICARE

## 2024-10-01 DIAGNOSIS — I35.0 AORTIC VALVE STENOSIS, SEVERE: Primary | ICD-10-CM

## 2024-10-01 RX ORDER — NICOTINE POLACRILEX 4 MG
15 LOZENGE BUCCAL
OUTPATIENT
Start: 2024-10-01

## 2024-10-01 RX ORDER — DEXTROSE MONOHYDRATE 25 G/50ML
10-50 INJECTION, SOLUTION INTRAVENOUS
OUTPATIENT
Start: 2024-10-01

## 2024-10-01 RX ORDER — IBUPROFEN 600 MG/1
1 TABLET ORAL
OUTPATIENT
Start: 2024-10-01

## 2024-10-01 RX ORDER — SODIUM CHLORIDE 9 MG/ML
30 INJECTION, SOLUTION INTRAVENOUS CONTINUOUS PRN
OUTPATIENT
Start: 2024-10-01

## 2024-10-01 RX ORDER — CHLORHEXIDINE GLUCONATE ORAL RINSE 1.2 MG/ML
15 SOLUTION DENTAL ONCE
OUTPATIENT
Start: 2024-10-01 | End: 2024-10-01

## 2024-10-01 RX ORDER — SODIUM CHLORIDE 0.9 % (FLUSH) 0.9 %
30 SYRINGE (ML) INJECTION ONCE AS NEEDED
OUTPATIENT
Start: 2024-10-01

## 2024-10-03 DIAGNOSIS — I11.0 HYPERTENSIVE HEART DISEASE WITH HEART FAILURE: ICD-10-CM

## 2024-10-03 DIAGNOSIS — I35.0 AORTIC VALVE STENOSIS, SEVERE: Primary | ICD-10-CM

## 2024-10-03 DIAGNOSIS — R79.9 ABNORMAL FINDING OF BLOOD CHEMISTRY, UNSPECIFIED: ICD-10-CM

## 2024-10-03 DIAGNOSIS — Z01.818 PRE-PROCEDURAL EXAMINATION: ICD-10-CM

## 2024-10-10 ENCOUNTER — TELEPHONE (OUTPATIENT)
Dept: CARDIOLOGY | Facility: HOSPITAL | Age: 74
End: 2024-10-10
Payer: MEDICARE

## 2024-10-10 ENCOUNTER — DOCUMENTATION (OUTPATIENT)
Dept: CARDIOLOGY | Facility: HOSPITAL | Age: 74
End: 2024-10-10
Payer: MEDICARE

## 2024-10-10 NOTE — NURSING NOTE
I have spoken with Ms Contreras numerous times concerning her mother-in-law Michelle Cardona. She has had labs drawn in [place] and has faxed them to us Dr Montano has reviewed We have discussed the TAVR procedure and post op expectations in detail I have been able to answer their questions. The TAVR is scheduled for 10/15/24 with a 9:30 arrival time. Ms Cardona lives alone and ambulates without an assistive device She does not require supplemental oxygen. Ms Cardona is very hard of hearing and her sight is extremely poor. Her skin is dry and shows no signs of infection specifically in her groin access site. She has not been hospitalized during the last twelve months for heart failure. The family has my contact information and has been encouraged to call with any questions or concerns

## 2024-10-14 NOTE — PERIOPERATIVE NURSING NOTE
The Structural Heart Team plans to utilize Albion Device during TAVR. Please leave the Right Radial artery for the intra-operative placement of the Albion Device.

## 2024-10-15 ENCOUNTER — ANCILLARY PROCEDURE (OUTPATIENT)
Dept: PERIOP | Facility: HOSPITAL | Age: 74
End: 2024-10-15
Payer: MEDICARE

## 2024-10-15 ENCOUNTER — HOSPITAL ENCOUNTER (INPATIENT)
Facility: HOSPITAL | Age: 74
LOS: 1 days | Discharge: HOME | End: 2024-10-16
Payer: MEDICARE

## 2024-10-15 ENCOUNTER — ANESTHESIA (OUTPATIENT)
Dept: PERIOP | Facility: HOSPITAL | Age: 74
End: 2024-10-15
Payer: MEDICARE

## 2024-10-15 ENCOUNTER — ANESTHESIA EVENT (OUTPATIENT)
Dept: PERIOP | Facility: HOSPITAL | Age: 74
End: 2024-10-15
Payer: MEDICARE

## 2024-10-15 DIAGNOSIS — I35.0 AORTIC VALVE STENOSIS, SEVERE: ICD-10-CM

## 2024-10-15 DIAGNOSIS — I35.0 NONRHEUMATIC AORTIC VALVE STENOSIS: Primary | ICD-10-CM

## 2024-10-15 DIAGNOSIS — Z95.2 S/P TAVR (TRANSCATHETER AORTIC VALVE REPLACEMENT): ICD-10-CM

## 2024-10-15 DIAGNOSIS — I50.32 CHRONIC DIASTOLIC CONGESTIVE HEART FAILURE: ICD-10-CM

## 2024-10-15 DIAGNOSIS — I35.0 AORTIC VALVE STENOSIS, SEVERE: Primary | ICD-10-CM

## 2024-10-15 LAB
ABO GROUP BLD: NORMAL
ABO GROUP BLD: NORMAL
ACT BLD: 122 SECONDS (ref 82–152)
ACT BLD: 140 SECONDS (ref 82–152)
ACT BLD: 317 SECONDS (ref 82–152)
BASE EXCESS BLDA CALC-SCNC: -2 MMOL/L (ref -5–5)
BH CV ECHO MEAS - AO MAX PG: 10.3 MMHG
BH CV ECHO MEAS - AO MEAN PG: 4.7 MMHG
BH CV ECHO MEAS - AO V2 MAX: 160.4 CM/SEC
BH CV ECHO MEAS - AO V2 VTI: 32.6 CM
BH CV ECHO MEAS - AVA(I,D): 2.29 CM2
BH CV ECHO MEAS - LV MAX PG: 7.5 MMHG
BH CV ECHO MEAS - LV MEAN PG: 3.2 MMHG
BH CV ECHO MEAS - LV V1 MAX: 137.3 CM/SEC
BH CV ECHO MEAS - LV V1 VTI: 23.3 CM
BH CV ECHO MEAS - LVOT AREA: 3.2 CM2
BH CV ECHO MEAS - LVOT DIAM: 2.02 CM
BH CV ECHO MEAS - SV(LVOT): 74.7 ML
BLD GP AB SCN SERPL QL: POSITIVE
CO2 BLDA-SCNC: 25 MMOL/L (ref 24–29)
DAT C3: NEGATIVE
DAT IGG-SP REAG RBC-IMP: POSITIVE
DAT POLY-SP REAG RBC QL: POSITIVE
GLUCOSE BLDC GLUCOMTR-MCNC: 101 MG/DL (ref 70–130)
HCO3 BLDA-SCNC: 23.7 MMOL/L (ref 22–26)
HCT VFR BLDA CALC: 30 % (ref 38–51)
HGB BLDA-MCNC: 10.2 G/DL (ref 12–17)
PCO2 BLDA: 43.9 MM HG (ref 35–45)
PH BLDA: 7.34 PH UNITS (ref 7.35–7.6)
PO2 BLDA: 139 MMHG (ref 80–105)
POTASSIUM BLDA-SCNC: 3.9 MMOL/L (ref 3.5–4.9)
PRESERVATIVE ANTIBODY: NORMAL
RH BLD: POSITIVE
RH BLD: POSITIVE
SAO2 % BLDA: 99 % (ref 95–98)
T&S EXPIRATION DATE: NORMAL

## 2024-10-15 PROCEDURE — 5A1223Z PERFORMANCE OF CARDIAC PACING, CONTINUOUS: ICD-10-PCS

## 2024-10-15 PROCEDURE — 86900 BLOOD TYPING SEROLOGIC ABO: CPT

## 2024-10-15 PROCEDURE — 86850 RBC ANTIBODY SCREEN: CPT

## 2024-10-15 PROCEDURE — 85347 COAGULATION TIME ACTIVATED: CPT

## 2024-10-15 PROCEDURE — B41D1ZZ FLUOROSCOPY OF AORTA AND BILATERAL LOWER EXTREMITY ARTERIES USING LOW OSMOLAR CONTRAST: ICD-10-PCS

## 2024-10-15 PROCEDURE — 25010000002 CEFAZOLIN PER 500 MG

## 2024-10-15 PROCEDURE — C1760 CLOSURE DEV, VASC: HCPCS

## 2024-10-15 PROCEDURE — 25010000002 HEPARIN (PORCINE) PER 1000 UNITS: Performed by: ANESTHESIOLOGY

## 2024-10-15 PROCEDURE — 25010000002 LIDOCAINE 1 % SOLUTION

## 2024-10-15 PROCEDURE — 33361 REPLACE AORTIC VALVE PERQ: CPT | Performed by: INTERNAL MEDICINE

## 2024-10-15 PROCEDURE — C1884 EMBOLIZATION PROTECT SYST: HCPCS

## 2024-10-15 PROCEDURE — 25010000002 LIDOCAINE 1% - EPINEPHRINE 1:100000 1 %-1:100000 SOLUTION

## 2024-10-15 PROCEDURE — 25010000002 PROTAMINE SULFATE PER 10 MG: Performed by: ANESTHESIOLOGY

## 2024-10-15 PROCEDURE — 86905 BLOOD TYPING RBC ANTIGENS: CPT

## 2024-10-15 PROCEDURE — 02RF38Z REPLACEMENT OF AORTIC VALVE WITH ZOOPLASTIC TISSUE, PERCUTANEOUS APPROACH: ICD-10-PCS

## 2024-10-15 PROCEDURE — 93325 DOPPLER ECHO COLOR FLOW MAPG: CPT

## 2024-10-15 PROCEDURE — 86880 COOMBS TEST DIRECT: CPT

## 2024-10-15 PROCEDURE — 82947 ASSAY GLUCOSE BLOOD QUANT: CPT

## 2024-10-15 PROCEDURE — 93308 TTE F-UP OR LMTD: CPT

## 2024-10-15 PROCEDURE — 93321 DOPPLER ECHO F-UP/LMTD STD: CPT

## 2024-10-15 PROCEDURE — C1889 IMPLANT/INSERT DEVICE, NOC: HCPCS

## 2024-10-15 PROCEDURE — C1769 GUIDE WIRE: HCPCS

## 2024-10-15 PROCEDURE — C1894 INTRO/SHEATH, NON-LASER: HCPCS

## 2024-10-15 PROCEDURE — 03HB33Z INSERTION OF INFUSION DEVICE INTO RIGHT RADIAL ARTERY, PERCUTANEOUS APPROACH: ICD-10-PCS | Performed by: ANESTHESIOLOGY

## 2024-10-15 PROCEDURE — 85014 HEMATOCRIT: CPT

## 2024-10-15 PROCEDURE — 86870 RBC ANTIBODY IDENTIFICATION: CPT

## 2024-10-15 PROCEDURE — 86901 BLOOD TYPING SEROLOGIC RH(D): CPT

## 2024-10-15 PROCEDURE — 03JY3ZZ INSPECTION OF UPPER ARTERY, PERCUTANEOUS APPROACH: ICD-10-PCS

## 2024-10-15 PROCEDURE — 86920 COMPATIBILITY TEST SPIN: CPT

## 2024-10-15 PROCEDURE — C1725 CATH, TRANSLUMIN NON-LASER: HCPCS

## 2024-10-15 PROCEDURE — 25010000002 DIPHENHYDRAMINE PER 50 MG: Performed by: ANESTHESIOLOGY

## 2024-10-15 PROCEDURE — 86922 COMPATIBILITY TEST ANTIGLOB: CPT

## 2024-10-15 PROCEDURE — 25510000001 IOPAMIDOL PER 1 ML

## 2024-10-15 PROCEDURE — 33361 REPLACE AORTIC VALVE PERQ: CPT

## 2024-10-15 PROCEDURE — 82803 BLOOD GASES ANY COMBINATION: CPT

## 2024-10-15 PROCEDURE — 25010000002 PHENYLEPHRINE 10 MG/ML SOLUTION: Performed by: ANESTHESIOLOGY

## 2024-10-15 PROCEDURE — 85018 HEMOGLOBIN: CPT

## 2024-10-15 PROCEDURE — B24BZZ4 ULTRASONOGRAPHY OF HEART WITH AORTA, TRANSESOPHAGEAL: ICD-10-PCS

## 2024-10-15 PROCEDURE — 25010000002 FENTANYL CITRATE (PF) 50 MCG/ML SOLUTION: Performed by: ANESTHESIOLOGY

## 2024-10-15 PROCEDURE — 25010000002 PROPOFOL 10 MG/ML EMULSION: Performed by: ANESTHESIOLOGY

## 2024-10-15 DEVICE — VLV HEART TRNSCATH SAPIEN/COMMANDER 26MM: Type: IMPLANTABLE DEVICE | Site: HEART | Status: FUNCTIONAL

## 2024-10-15 RX ORDER — IPRATROPIUM BROMIDE AND ALBUTEROL SULFATE 2.5; .5 MG/3ML; MG/3ML
3 SOLUTION RESPIRATORY (INHALATION) ONCE AS NEEDED
Status: DISCONTINUED | OUTPATIENT
Start: 2024-10-15 | End: 2024-10-15 | Stop reason: HOSPADM

## 2024-10-15 RX ORDER — NALOXONE HCL 0.4 MG/ML
0.2 VIAL (ML) INJECTION AS NEEDED
Status: DISCONTINUED | OUTPATIENT
Start: 2024-10-15 | End: 2024-10-15 | Stop reason: HOSPADM

## 2024-10-15 RX ORDER — PROPOFOL 10 MG/ML
VIAL (ML) INTRAVENOUS AS NEEDED
Status: DISCONTINUED | OUTPATIENT
Start: 2024-10-15 | End: 2024-10-15 | Stop reason: SURG

## 2024-10-15 RX ORDER — SODIUM CHLORIDE, SODIUM LACTATE, POTASSIUM CHLORIDE, CALCIUM CHLORIDE 600; 310; 30; 20 MG/100ML; MG/100ML; MG/100ML; MG/100ML
9 INJECTION, SOLUTION INTRAVENOUS CONTINUOUS
Status: ACTIVE | OUTPATIENT
Start: 2024-10-15 | End: 2024-10-16

## 2024-10-15 RX ORDER — DEXMEDETOMIDINE HYDROCHLORIDE 100 UG/ML
INJECTION, SOLUTION INTRAVENOUS AS NEEDED
Status: DISCONTINUED | OUTPATIENT
Start: 2024-10-15 | End: 2024-10-15 | Stop reason: SURG

## 2024-10-15 RX ORDER — IOPAMIDOL 755 MG/ML
INJECTION, SOLUTION INTRAVASCULAR AS NEEDED
Status: DISCONTINUED | OUTPATIENT
Start: 2024-10-15 | End: 2024-10-15 | Stop reason: HOSPADM

## 2024-10-15 RX ORDER — ONDANSETRON 2 MG/ML
4 INJECTION INTRAMUSCULAR; INTRAVENOUS EVERY 6 HOURS PRN
Status: DISCONTINUED | OUTPATIENT
Start: 2024-10-15 | End: 2024-10-16 | Stop reason: HOSPADM

## 2024-10-15 RX ORDER — SODIUM CHLORIDE 0.9 % (FLUSH) 0.9 %
30 SYRINGE (ML) INJECTION ONCE AS NEEDED
Status: DISCONTINUED | OUTPATIENT
Start: 2024-10-15 | End: 2024-10-15 | Stop reason: HOSPADM

## 2024-10-15 RX ORDER — LIDOCAINE HYDROCHLORIDE 10 MG/ML
INJECTION, SOLUTION INFILTRATION; PERINEURAL AS NEEDED
Status: DISCONTINUED | OUTPATIENT
Start: 2024-10-15 | End: 2024-10-15 | Stop reason: HOSPADM

## 2024-10-15 RX ORDER — DIPHENHYDRAMINE HYDROCHLORIDE 50 MG/ML
12.5 INJECTION INTRAMUSCULAR; INTRAVENOUS
Status: COMPLETED | OUTPATIENT
Start: 2024-10-15 | End: 2024-10-15

## 2024-10-15 RX ORDER — CHLORHEXIDINE GLUCONATE ORAL RINSE 1.2 MG/ML
15 SOLUTION DENTAL ONCE
Status: COMPLETED | OUTPATIENT
Start: 2024-10-15 | End: 2024-10-15

## 2024-10-15 RX ORDER — HYDROCODONE BITARTRATE AND ACETAMINOPHEN 5; 325 MG/1; MG/1
1 TABLET ORAL EVERY 4 HOURS PRN
Status: DISCONTINUED | OUTPATIENT
Start: 2024-10-15 | End: 2024-10-16 | Stop reason: HOSPADM

## 2024-10-15 RX ORDER — NICOTINE POLACRILEX 4 MG
15 LOZENGE BUCCAL
Status: DISCONTINUED | OUTPATIENT
Start: 2024-10-15 | End: 2024-10-15 | Stop reason: HOSPADM

## 2024-10-15 RX ORDER — LIDOCAINE HYDROCHLORIDE 10 MG/ML
0.5 INJECTION, SOLUTION INFILTRATION; PERINEURAL ONCE AS NEEDED
Status: DISCONTINUED | OUTPATIENT
Start: 2024-10-15 | End: 2024-10-15 | Stop reason: HOSPADM

## 2024-10-15 RX ORDER — LABETALOL HYDROCHLORIDE 5 MG/ML
5 INJECTION, SOLUTION INTRAVENOUS
Status: DISCONTINUED | OUTPATIENT
Start: 2024-10-15 | End: 2024-10-15 | Stop reason: HOSPADM

## 2024-10-15 RX ORDER — CHLORHEXIDINE GLUCONATE ORAL RINSE 1.2 MG/ML
15 SOLUTION DENTAL 2 TIMES DAILY
COMMUNITY
Start: 2024-10-10 | End: 2024-10-16 | Stop reason: HOSPADM

## 2024-10-15 RX ORDER — ASPIRIN 81 MG/1
81 TABLET ORAL DAILY
Status: DISCONTINUED | OUTPATIENT
Start: 2024-10-15 | End: 2024-10-16 | Stop reason: HOSPADM

## 2024-10-15 RX ORDER — PHENYLEPHRINE HYDROCHLORIDE 10 MG/ML
INJECTION INTRAVENOUS AS NEEDED
Status: DISCONTINUED | OUTPATIENT
Start: 2024-10-15 | End: 2024-10-15 | Stop reason: SURG

## 2024-10-15 RX ORDER — SODIUM CHLORIDE 9 MG/ML
100 INJECTION, SOLUTION INTRAVENOUS CONTINUOUS
Status: ACTIVE | OUTPATIENT
Start: 2024-10-15 | End: 2024-10-15

## 2024-10-15 RX ORDER — FENTANYL CITRATE 50 UG/ML
25 INJECTION, SOLUTION INTRAMUSCULAR; INTRAVENOUS
Status: DISCONTINUED | OUTPATIENT
Start: 2024-10-15 | End: 2024-10-15 | Stop reason: HOSPADM

## 2024-10-15 RX ORDER — NOREPINEPHRINE BITARTRATE 1 MG/ML
INJECTION, SOLUTION INTRAVENOUS CONTINUOUS PRN
Status: DISCONTINUED | OUTPATIENT
Start: 2024-10-15 | End: 2024-10-15 | Stop reason: SURG

## 2024-10-15 RX ORDER — EPHEDRINE SULFATE 50 MG/ML
5 INJECTION, SOLUTION INTRAVENOUS ONCE AS NEEDED
Status: DISCONTINUED | OUTPATIENT
Start: 2024-10-15 | End: 2024-10-15 | Stop reason: HOSPADM

## 2024-10-15 RX ORDER — LEVOTHYROXINE SODIUM 50 UG/1
50 TABLET ORAL EVERY MORNING
Status: DISCONTINUED | OUTPATIENT
Start: 2024-10-16 | End: 2024-10-16 | Stop reason: HOSPADM

## 2024-10-15 RX ORDER — DEXTROSE MONOHYDRATE 25 G/50ML
10-50 INJECTION, SOLUTION INTRAVENOUS
Status: DISCONTINUED | OUTPATIENT
Start: 2024-10-15 | End: 2024-10-15 | Stop reason: HOSPADM

## 2024-10-15 RX ORDER — IBUPROFEN 600 MG/1
1 TABLET ORAL
Status: DISCONTINUED | OUTPATIENT
Start: 2024-10-15 | End: 2024-10-15 | Stop reason: HOSPADM

## 2024-10-15 RX ORDER — SODIUM CHLORIDE 0.9 % (FLUSH) 0.9 %
3 SYRINGE (ML) INJECTION EVERY 12 HOURS SCHEDULED
Status: DISCONTINUED | OUTPATIENT
Start: 2024-10-15 | End: 2024-10-15 | Stop reason: HOSPADM

## 2024-10-15 RX ORDER — FERROUS SULFATE 325(65) MG
325 TABLET ORAL
Status: DISCONTINUED | OUTPATIENT
Start: 2024-10-15 | End: 2024-10-16 | Stop reason: HOSPADM

## 2024-10-15 RX ORDER — ACETAMINOPHEN 325 MG/1
650 TABLET ORAL EVERY 4 HOURS PRN
Status: DISCONTINUED | OUTPATIENT
Start: 2024-10-15 | End: 2024-10-16 | Stop reason: HOSPADM

## 2024-10-15 RX ORDER — HEPARIN SODIUM 1000 [USP'U]/ML
INJECTION, SOLUTION INTRAVENOUS; SUBCUTANEOUS AS NEEDED
Status: DISCONTINUED | OUTPATIENT
Start: 2024-10-15 | End: 2024-10-15 | Stop reason: SURG

## 2024-10-15 RX ORDER — FAMOTIDINE 10 MG/ML
20 INJECTION, SOLUTION INTRAVENOUS ONCE
Status: COMPLETED | OUTPATIENT
Start: 2024-10-15 | End: 2024-10-15

## 2024-10-15 RX ORDER — ACETAMINOPHEN 500 MG
500 TABLET ORAL ONCE
Status: DISCONTINUED | OUTPATIENT
Start: 2024-10-15 | End: 2024-10-15 | Stop reason: HOSPADM

## 2024-10-15 RX ORDER — LIDOCAINE HYDROCHLORIDE AND EPINEPHRINE 10; 10 MG/ML; UG/ML
INJECTION, SOLUTION INFILTRATION; PERINEURAL AS NEEDED
Status: DISCONTINUED | OUTPATIENT
Start: 2024-10-15 | End: 2024-10-15 | Stop reason: HOSPADM

## 2024-10-15 RX ORDER — HYDRALAZINE HYDROCHLORIDE 20 MG/ML
5 INJECTION INTRAMUSCULAR; INTRAVENOUS
Status: DISCONTINUED | OUTPATIENT
Start: 2024-10-15 | End: 2024-10-15 | Stop reason: HOSPADM

## 2024-10-15 RX ORDER — ONDANSETRON 2 MG/ML
4 INJECTION INTRAMUSCULAR; INTRAVENOUS ONCE AS NEEDED
Status: DISCONTINUED | OUTPATIENT
Start: 2024-10-15 | End: 2024-10-15 | Stop reason: HOSPADM

## 2024-10-15 RX ORDER — PANTOPRAZOLE SODIUM 40 MG/1
40 TABLET, DELAYED RELEASE ORAL
Status: DISCONTINUED | OUTPATIENT
Start: 2024-10-16 | End: 2024-10-16 | Stop reason: HOSPADM

## 2024-10-15 RX ORDER — NITROGLYCERIN 0.4 MG/1
0.4 TABLET SUBLINGUAL
Status: DISCONTINUED | OUTPATIENT
Start: 2024-10-15 | End: 2024-10-16 | Stop reason: HOSPADM

## 2024-10-15 RX ORDER — ONDANSETRON 4 MG/1
4 TABLET, ORALLY DISINTEGRATING ORAL EVERY 6 HOURS PRN
Status: DISCONTINUED | OUTPATIENT
Start: 2024-10-15 | End: 2024-10-16 | Stop reason: HOSPADM

## 2024-10-15 RX ORDER — SODIUM CHLORIDE 0.9 % (FLUSH) 0.9 %
3-10 SYRINGE (ML) INJECTION AS NEEDED
Status: DISCONTINUED | OUTPATIENT
Start: 2024-10-15 | End: 2024-10-15 | Stop reason: HOSPADM

## 2024-10-15 RX ORDER — MUPIROCIN 20 MG/G
1 OINTMENT TOPICAL 2 TIMES DAILY
COMMUNITY
Start: 2024-10-10 | End: 2024-10-16 | Stop reason: HOSPADM

## 2024-10-15 RX ORDER — SODIUM CHLORIDE 9 MG/ML
30 INJECTION, SOLUTION INTRAVENOUS CONTINUOUS PRN
Status: DISCONTINUED | OUTPATIENT
Start: 2024-10-15 | End: 2024-10-16 | Stop reason: HOSPADM

## 2024-10-15 RX ORDER — FERROUS SULFATE 325(65) MG
325 TABLET ORAL
COMMUNITY
Start: 2024-10-04

## 2024-10-15 RX ORDER — PROTAMINE SULFATE 10 MG/ML
INJECTION, SOLUTION INTRAVENOUS AS NEEDED
Status: DISCONTINUED | OUTPATIENT
Start: 2024-10-15 | End: 2024-10-15 | Stop reason: SURG

## 2024-10-15 RX ORDER — HYDROXYZINE HYDROCHLORIDE 25 MG/1
25 TABLET, FILM COATED ORAL DAILY
Status: DISCONTINUED | OUTPATIENT
Start: 2024-10-15 | End: 2024-10-16 | Stop reason: HOSPADM

## 2024-10-15 RX ORDER — SODIUM CHLORIDE 9 MG/ML
INJECTION, SOLUTION INTRAVENOUS CONTINUOUS PRN
Status: DISCONTINUED | OUTPATIENT
Start: 2024-10-15 | End: 2024-10-15 | Stop reason: SURG

## 2024-10-15 RX ORDER — ATORVASTATIN CALCIUM 80 MG/1
40 TABLET, FILM COATED ORAL EVERY EVENING
Status: DISCONTINUED | OUTPATIENT
Start: 2024-10-15 | End: 2024-10-16 | Stop reason: HOSPADM

## 2024-10-15 RX ADMIN — DEXMEDETOMIDINE HCL 16 MCG: 100 INJECTION INTRAVENOUS at 12:16

## 2024-10-15 RX ADMIN — PHENYLEPHRINE HYDROCHLORIDE 100 MCG: 10 INJECTION INTRAVENOUS at 12:51

## 2024-10-15 RX ADMIN — DEXMEDETOMIDINE HCL 12 MCG: 100 INJECTION INTRAVENOUS at 12:09

## 2024-10-15 RX ADMIN — PROPOFOL 20 MG: 10 INJECTION, EMULSION INTRAVENOUS at 12:22

## 2024-10-15 RX ADMIN — 0.12% CHLORHEXIDINE GLUCONATE 15 ML: 1.2 RINSE ORAL at 11:49

## 2024-10-15 RX ADMIN — ATORVASTATIN CALCIUM 40 MG: 80 TABLET, FILM COATED ORAL at 17:35

## 2024-10-15 RX ADMIN — DIPHENHYDRAMINE HYDROCHLORIDE 12.5 MG: 50 INJECTION, SOLUTION INTRAMUSCULAR; INTRAVENOUS at 14:05

## 2024-10-15 RX ADMIN — HEPARIN SODIUM 11000 UNITS: 1000 INJECTION, SOLUTION INTRAVENOUS; SUBCUTANEOUS at 12:52

## 2024-10-15 RX ADMIN — ASPIRIN 81 MG: 81 TABLET, COATED ORAL at 17:35

## 2024-10-15 RX ADMIN — PROTAMINE SULFATE 50 MG: 10 INJECTION, SOLUTION INTRAVENOUS at 13:35

## 2024-10-15 RX ADMIN — NOREPINEPHRINE BITARTRATE 0.02 MCG/KG/MIN: 1 SOLUTION INTRAVENOUS at 12:51

## 2024-10-15 RX ADMIN — FENTANYL CITRATE 25 MCG: 50 INJECTION, SOLUTION INTRAMUSCULAR; INTRAVENOUS at 14:29

## 2024-10-15 RX ADMIN — FAMOTIDINE 20 MG: 10 INJECTION INTRAVENOUS at 11:49

## 2024-10-15 RX ADMIN — FERROUS SULFATE TAB 325 MG (65 MG ELEMENTAL FE) 325 MG: 325 (65 FE) TAB at 17:36

## 2024-10-15 RX ADMIN — SODIUM CHLORIDE: 9 INJECTION, SOLUTION INTRAVENOUS at 12:05

## 2024-10-15 RX ADMIN — SODIUM CHLORIDE 2 G: 900 INJECTION INTRAVENOUS at 12:17

## 2024-10-15 RX ADMIN — PROPOFOL 2 MG: 10 INJECTION, EMULSION INTRAVENOUS at 12:13

## 2024-10-15 RX ADMIN — PHENYLEPHRINE HYDROCHLORIDE 100 MCG: 10 INJECTION INTRAVENOUS at 13:15

## 2024-10-15 RX ADMIN — PROPOFOL 25 MCG/KG/MIN: 10 INJECTION, EMULSION INTRAVENOUS at 12:15

## 2024-10-15 RX ADMIN — SODIUM CHLORIDE 1 MCG/KG/HR: 9 INJECTION, SOLUTION INTRAVENOUS at 12:02

## 2024-10-15 RX ADMIN — DEXMEDETOMIDINE HCL 12 MCG: 100 INJECTION INTRAVENOUS at 12:04

## 2024-10-15 RX ADMIN — DIPHENHYDRAMINE HYDROCHLORIDE 12.5 MG: 50 INJECTION, SOLUTION INTRAMUSCULAR; INTRAVENOUS at 14:21

## 2024-10-15 RX ADMIN — Medication 3 ML: at 11:50

## 2024-10-15 NOTE — ANESTHESIA POSTPROCEDURE EVALUATION
"Patient: Mcihelle Cardona    Procedure Summary       Date: 10/15/24 Room / Location: [address] CARDIOVASCULAR OPERATING ROOM    Anesthesia Start: 1159 Anesthesia Stop: 1359    Procedures:       TRANSFEMORAL TRANSCATHETER AORTIC VALVE REPLACEMENT with intraoperative transthoracic echocardiogram (Chest)      Transfemoral Transcatheter Aortic Valve Replacement with intraoperative transthoracic echocardiogram Diagnosis:       Aortic valve stenosis, severe      (Aortic valve stenosis, severe [I35.0])    Surgeons: Rik Schumacher MD; George Montano MD Provider: Viry Akins MD    Anesthesia Type: Evita, MAC ASA Status: 4            Anesthesia Type: Evita, MAC    Vitals  Vitals Value Taken Time   /60 10/15/24 1600   Temp 36.6 °C (97.9 °F) 10/15/24 1355   Pulse 62 10/15/24 1607   Resp 16 10/15/24 1530   SpO2 97 % 10/15/24 1607   Vitals shown include unfiled device data.        Post Anesthesia Care and Evaluation    Patient location during evaluation: bedside  Patient participation: complete - patient participated  Level of consciousness: awake  Pain management: adequate    Airway patency: patent  Anesthetic complications: No anesthetic complications    Cardiovascular status: acceptable  Respiratory status: acceptable  Hydration status: acceptable    Comments: BP 95/56   Pulse 65   Temp 36.6 °C (97.9 °F) (Oral)   Resp 16   Ht 167.6 cm (66\")   Wt 71.4 kg (157 lb 8 oz)   SpO2 100%   BMI 25.42 kg/m²     "

## 2024-10-15 NOTE — OP NOTE
TAVR OPERATIVE REPORT    CO-SURGEON: Rik Schumacher MD  CO-SURGEON: George Montano MD    DIAGNOSIS: Severe symptomatic aortic stenosis.     POSTOP DIAGNOSIS: Severe symptomatic aortic stenosis.     PRESENT CO-MORBIDITIES: Severe Aortic Stenosis, HTN, GERD, COPD, Anemia, SVT, DDD, Blind/ Lime    PROCEDURE: Elective procedure in hybrid operating room     1. Transcatheter aortic valve replacement (TAVR) utilizing a 14 Guatemalan eSheath and a 26 mm Cornelio 3 transcatheter heart valve  2. Percutaneous left femoral arterial access   3. Percutaneous right femoral arterial, right radial arterial and right femoral venous access  4. Abdominal aortography and bilateral lower extremity angiography  5. Transvenous pacing using a 5-Guatemalan balloon-tip pacer  6. Supravalvular aortogram  7. Aortic valvuloplasty using 22 mm x 4 cm balloon catheter  8. Transthoracic echocardiogram  9. Arterial line placement  10. [place] device      INDICATIONS FOR OPERATION: The patient is a 73-year-old with New York Heart Association Class III symptoms and STS score of 2%. The patient was determined to be best suited for TAVR by the multidisciplinary heart team due to age, comorbidities    FDA TAVR INDICATIONS: The patient was judged to be suitable for TAVR by the multidisciplinary team as reviewed by two cardiac surgeons, an interventional cardiologist, and the rest of the TAVR team.  The patient, family and team were in agreement that the case would convert to an open surgical AVR in the event of unsuccessful TAVR. The patient’s co-morbidities would not preclude the expected benefit from correction of the aortic stenosis by TAVR based on the team discussion. The patient will be enrolled in the STS/ACC TAVR TVT registry.     DESCRIPTION: Dr. Montano (interventional cardiologist) and Dr. Schumacher (cardiothoracic surgeon) performed the procedure together in all preoperative and operative aspects. The patient was taken to the hybrid OR. A  "radial art line, central venous access, and Davison catheter were inserted preoperatively. Anesthesia was administered without apparent complication. The patient was prepped and draped in the usual sterile fashion.      Using a micropuncture technique, access was obtained in the right femoral artery and a microsheath was inserted.  Angiography through the microsheath confirmed good position of the arterial access point.  Two Perclose sutures were deployed in offset manner in the right femoral artery using the \"Preclose\" technique.  An 8 Barbadian sheath was inserted.      A similar technique was used to obtain access in the left common femoral artery.    A 6 Barbadian sheath was inserted.  Access was obtained in the left femoral vein and a 6 Barbadian sheath was inserted.   A 5 Barbadian pacing catheter was directed to the RV apex and was tested.  A 6 Barbadian pigtail was directed to the right coronary cusp.  Angiography was performed using 20 cc of contrast.     A similar technique was used to obtain access in the right radial artery. A 6 Barbadian sheath was inserted. A sentinel device for stroke prevention was implanted in the innominate artery and LCCA.    We inserted an 5 Fr FR 4 catheter to the aortic arch.  Through this, we inserted a Lunderquist wire.  Over this we performed sequential dilation until a 14 Barbadian E-sheath could be inserted to the abdominal aorta.  We exchanged for an 6 Barbadian AL-1 catheter and crossed the valve easily using a straight wire.  We exchanged for a pigtail catheter in the LV, then optimized position in the LV apex.  We then inserted an Amplatz Extra Stiff guidewire.  In a coordinated manner, we performed balloon aortic valvuloplasty using rapid ventriclar pacing and a 22 mm x 4 cm Barnett BAV catheter.  Aortography was performed during balloon inflation to confirm the deployment angle.    TTE confirmed a good valvuloplasty result without any vascular or cardiac injury.  The orientation of a 26 mm " Barnett S3 TAVR valve was confirmed by multiple physicians, then was loaded in the sheath and was advanced to the descending aorta.  The valve was centered on the balloon easily, then was advanced across the aortic arch.     The THV was then placed and repositioned in the aortic annulus, and placement was confirmed by an aortogram. The co-operators were all in agreement for valve positioning prior to deployment. In a coordinated fashion, rapid ventricular pacing and the TAVR valve was deployed for a total of 5 seconds.  The valve delivery balloon was then deflated, then was withdrawn and pacing was discontinued. We exchanged the TAVR delivery system for a pigtail catheter.  Proper valve placement, orientation and degree of regurgitation was then evaluated by transthoracic echocardiogram and aortography. The co-operators agreed that no further intervention was necessary.    We then withdrew the eSheath to the distal external iliac artery with the wire still in place. From the contralateral side, the pigtail catheter was advanced into the distal abdomen and abdominal aortography was performed. All parties were in agreement that there was no flow-limiting vascular trauma or extravasation of dye, at which point closure of the arteriotomy was performed with our two Perclose devices.  The venous sheath and temporary pacemaker were then removed. The right radial artery sheat and the sentinel device was then removed. Finally, our contralateral access was removed and closed using a 6 Amharic Angioseal. The patient left in the care of the anesthesia team for extubation and hemodynamic monitoring. The patient was transported to the Open Heart Recovery Unit for further monitoring and care.       1. Hemodynamics:  Post TAVR aortic gradient: 5mmHg.  Post TAVR AI: none . Post JOSSELYN: 2.2 cm2.     CONTRAST USED: 145 mL.     FLUROSCOPY TIME:  15.2 min    Air KERMA:  402 Gray    EBL: minimal    SPECIMENS: none    CONCLUSIONS:  Successful  deployment of a 26 mm Cornelio 3 transcatheter aortic heart valve.

## 2024-10-15 NOTE — ANESTHESIA PROCEDURE NOTES
Arterial Line      Patient reassessed immediately prior to procedure    Start time: 10/15/2024 12:06 PM  Stop Time:10/15/2024 12:12 PM       Line placed for hemodynamic monitoring and ABGs/Labs/ISTAT.  Performed By   Anesthesiologist: Viry Akins MD   Preanesthetic Checklist  Completed: patient identified, IV checked, site marked, risks and benefits discussed, surgical consent, monitors and equipment checked, pre-op evaluation and timeout performed  Arterial Line Prep    Sterile Tech: gloves, mask and cap  Prep: ChloraPrep  Patient monitoring: blood pressure monitoring, continuous pulse oximetry and EKG  Arterial Line Procedure   Laterality:left  Location:  radial artery  Catheter size: 20 G   Guidance: ultrasound guided  PROCEDURE NOTE/ULTRASOUND INTERPRETATION.  Using ultrasound guidance the potential vascular sites for insertion of the catheter were visualized to determine the patency of the vessel to be used for vascular access.  After selecting the appropriate site for insertion, the needle was visualized under ultrasound being inserted into the radial artery, followed by ultrasound confirmation of wire and catheter placement. There were no abnormalities seen on ultrasound; an image was taken; and the patient tolerated the procedure with no complications.   Number of attempts: 1  Successful placement: yes Images: still images not obtained  Post Assessment   Dressing Type: secured with tape and occlusive dressing applied.   Complications no  Circ/Move/Sens Assessment: normal and unchanged.   Patient Tolerance: patient tolerated the procedure well with no apparent complications

## 2024-10-15 NOTE — ANESTHESIA PREPROCEDURE EVALUATION
Anesthesia Evaluation     Patient summary reviewed and Nursing notes reviewed   no history of anesthetic complications:   NPO Solid Status: > 8 hours  NPO Liquid Status: > 8 hours           Airway   Mallampati: II  TM distance: >3 FB  Neck ROM: full  No difficulty expected  Dental    (+) edentulous    Pulmonary    (+) a smoker Former, Abstained day of surgery, COPD,  (-) asthma, sleep apnea, rhonchi, decreased breath sounds, wheezes  Cardiovascular   Exercise tolerance: good (4-7 METS)    Rhythm: regular  Rate: normal    (+) valvular problems/murmurs AS  (-) hypertension, CAD, dysrhythmias, angina, SHARMA, murmur    ROS comment: LVEF 55% mod pulm HTN Severe AS mean gradient 74mmHg     Neuro/Psych  (-) seizures, CVA  GI/Hepatic/Renal/Endo    (+) GERD well controlled, thyroid problem hypothyroidism  (-) liver disease, no renal disease, diabetes    Musculoskeletal     Abdominal     Abdomen: soft.   Substance History      OB/GYN          Other                    Anesthesia Plan    ASA 4     Evita and MAC     intravenous induction     Anesthetic plan, risks, benefits, and alternatives have been provided, discussed and informed consent has been obtained with: patient.    CODE STATUS:

## 2024-10-16 ENCOUNTER — APPOINTMENT (OUTPATIENT)
Dept: CARDIOLOGY | Facility: HOSPITAL | Age: 74
End: 2024-10-16
Payer: MEDICARE

## 2024-10-16 VITALS
RESPIRATION RATE: 16 BRPM | HEIGHT: 66 IN | HEART RATE: 86 BPM | WEIGHT: 157 LBS | DIASTOLIC BLOOD PRESSURE: 66 MMHG | SYSTOLIC BLOOD PRESSURE: 101 MMHG | OXYGEN SATURATION: 97 % | TEMPERATURE: 99.7 F | BODY MASS INDEX: 25.23 KG/M2

## 2024-10-16 LAB
ANION GAP SERPL CALCULATED.3IONS-SCNC: 8.9 MMOL/L (ref 5–15)
AORTIC DIMENSIONLESS INDEX: 0.5 (DI)
ASCENDING AORTA: 3.4 CM
BH CV ECHO MEAS - AO MAX PG: 20.3 MMHG
BH CV ECHO MEAS - AO MEAN PG: 11.1 MMHG
BH CV ECHO MEAS - AO ROOT DIAM: 2.8 CM
BH CV ECHO MEAS - AO V2 MAX: 225.1 CM/SEC
BH CV ECHO MEAS - AO V2 VTI: 41.4 CM
BH CV ECHO MEAS - AVA(I,D): 1.66 CM2
BH CV ECHO MEAS - EDV(CUBED): 85.1 ML
BH CV ECHO MEAS - EDV(MOD-SP2): 91 ML
BH CV ECHO MEAS - EDV(MOD-SP4): 76 ML
BH CV ECHO MEAS - EF(MOD-BP): 64 %
BH CV ECHO MEAS - EF(MOD-SP2): 63.7 %
BH CV ECHO MEAS - EF(MOD-SP4): 68.4 %
BH CV ECHO MEAS - ESV(CUBED): 21.7 ML
BH CV ECHO MEAS - ESV(MOD-SP2): 33 ML
BH CV ECHO MEAS - ESV(MOD-SP4): 24 ML
BH CV ECHO MEAS - FS: 36.6 %
BH CV ECHO MEAS - IVS/LVPW: 0.97 CM
BH CV ECHO MEAS - IVSD: 1.05 CM
BH CV ECHO MEAS - LA DIMENSION: 3.4 CM
BH CV ECHO MEAS - LAT PEAK E' VEL: 7.1 CM/SEC
BH CV ECHO MEAS - LV DIASTOLIC VOL/BSA (35-75): 42.1 CM2
BH CV ECHO MEAS - LV MASS(C)D: 160.8 GRAMS
BH CV ECHO MEAS - LV MAX PG: 4.8 MMHG
BH CV ECHO MEAS - LV MEAN PG: 2.4 MMHG
BH CV ECHO MEAS - LV SYSTOLIC VOL/BSA (12-30): 13.3 CM2
BH CV ECHO MEAS - LV V1 MAX: 110 CM/SEC
BH CV ECHO MEAS - LV V1 VTI: 21.8 CM
BH CV ECHO MEAS - LVIDD: 4.4 CM
BH CV ECHO MEAS - LVIDS: 2.8 CM
BH CV ECHO MEAS - LVOT AREA: 3.2 CM2
BH CV ECHO MEAS - LVOT DIAM: 2 CM
BH CV ECHO MEAS - LVPWD: 1.08 CM
BH CV ECHO MEAS - MED PEAK E' VEL: 6.5 CM/SEC
BH CV ECHO MEAS - MR MAX PG: 78.7 MMHG
BH CV ECHO MEAS - MR MAX VEL: 443.5 CM/SEC
BH CV ECHO MEAS - MV A DUR: 0.14 SEC
BH CV ECHO MEAS - MV A MAX VEL: 89.1 CM/SEC
BH CV ECHO MEAS - MV DEC SLOPE: 417.9 CM/SEC2
BH CV ECHO MEAS - MV DEC TIME: 226 SEC
BH CV ECHO MEAS - MV E MAX VEL: 93.8 CM/SEC
BH CV ECHO MEAS - MV E/A: 1.05
BH CV ECHO MEAS - MV MAX PG: 3.9 MMHG
BH CV ECHO MEAS - MV MEAN PG: 2.29 MMHG
BH CV ECHO MEAS - MV P1/2T: 78.9 MSEC
BH CV ECHO MEAS - MV V2 VTI: 33.5 CM
BH CV ECHO MEAS - MVA(P1/2T): 2.8 CM2
BH CV ECHO MEAS - MVA(VTI): 2.05 CM2
BH CV ECHO MEAS - PA ACC TIME: 0.23 SEC
BH CV ECHO MEAS - PA V2 MAX: 105.5 CM/SEC
BH CV ECHO MEAS - PULM A REVS DUR: 0.11 SEC
BH CV ECHO MEAS - PULM A REVS VEL: 37.9 CM/SEC
BH CV ECHO MEAS - PULM DIAS VEL: 36.5 CM/SEC
BH CV ECHO MEAS - PULM S/D: 1.55
BH CV ECHO MEAS - PULM SYS VEL: 56.6 CM/SEC
BH CV ECHO MEAS - QP/QS: 1.81
BH CV ECHO MEAS - RAP SYSTOLE: 3 MMHG
BH CV ECHO MEAS - RV MAX PG: 3 MMHG
BH CV ECHO MEAS - RV V1 MAX: 86.5 CM/SEC
BH CV ECHO MEAS - RV V1 VTI: 20.7 CM
BH CV ECHO MEAS - RVDD: 2.01 CM
BH CV ECHO MEAS - RVOT DIAM: 2.8 CM
BH CV ECHO MEAS - RVSP: 46.8 MMHG
BH CV ECHO MEAS - SV(LVOT): 68.8 ML
BH CV ECHO MEAS - SV(MOD-SP2): 58 ML
BH CV ECHO MEAS - SV(MOD-SP4): 52 ML
BH CV ECHO MEAS - SV(RVOT): 124.8 ML
BH CV ECHO MEAS - SVI(LVOT): 38.1 ML/M2
BH CV ECHO MEAS - SVI(MOD-SP2): 32.1 ML/M2
BH CV ECHO MEAS - SVI(MOD-SP4): 28.8 ML/M2
BH CV ECHO MEAS - TAPSE (>1.6): 2.39 CM
BH CV ECHO MEAS - TR MAX PG: 43.8 MMHG
BH CV ECHO MEAS - TR MAX VEL: 331 CM/SEC
BH CV ECHO MEASUREMENTS AVERAGE E/E' RATIO: 13.79
BH CV XLRA - RV BASE: 2.33 CM
BH CV XLRA - RV LENGTH: 5.4 CM
BH CV XLRA - RV MID: 1.74 CM
BH CV XLRA - TDI S': 24.4 CM/SEC
BUN SERPL-MCNC: 9 MG/DL (ref 8–23)
BUN/CREAT SERPL: 9.9 (ref 7–25)
C AG RBC QL: NEGATIVE
CALCIUM SPEC-SCNC: 9.1 MG/DL (ref 8.6–10.5)
CHLORIDE SERPL-SCNC: 106 MMOL/L (ref 98–107)
CO2 SERPL-SCNC: 22.1 MMOL/L (ref 22–29)
CREAT SERPL-MCNC: 0.91 MG/DL (ref 0.57–1)
DEPRECATED RDW RBC AUTO: 42.9 FL (ref 37–54)
EGFRCR SERPLBLD CKD-EPI 2021: 67.2 ML/MIN/1.73
ERYTHROCYTE [DISTWIDTH] IN BLOOD BY AUTOMATED COUNT: 16.4 % (ref 12.3–15.4)
GLUCOSE SERPL-MCNC: 93 MG/DL (ref 65–99)
HCT VFR BLD AUTO: 27.3 % (ref 34–46.6)
HGB BLD-MCNC: 7.8 G/DL (ref 12–15.9)
LEFT ATRIUM VOLUME INDEX: 29.9 ML/M2
LITTLE E: POSITIVE
MAXIMAL PREDICTED HEART RATE: 148 BPM
MCH RBC QN AUTO: 20.8 PG (ref 26.6–33)
MCHC RBC AUTO-ENTMCNC: 28.6 G/DL (ref 31.5–35.7)
MCV RBC AUTO: 72.8 FL (ref 79–97)
PLATELET # BLD AUTO: 253 10*3/MM3 (ref 140–450)
PMV BLD AUTO: 10.9 FL (ref 6–12)
POTASSIUM SERPL-SCNC: 3.7 MMOL/L (ref 3.5–5.2)
QT INTERVAL: 372 MS
QTC INTERVAL: 413 MS
RBC # BLD AUTO: 3.75 10*6/MM3 (ref 3.77–5.28)
SINUS: 3.2 CM
SODIUM SERPL-SCNC: 137 MMOL/L (ref 136–145)
STJ: 3.3 CM
STRESS TARGET HR: 126 BPM
WBC NRBC COR # BLD AUTO: 8.8 10*3/MM3 (ref 3.4–10.8)

## 2024-10-16 PROCEDURE — 99232 SBSQ HOSP IP/OBS MODERATE 35: CPT | Performed by: INTERNAL MEDICINE

## 2024-10-16 PROCEDURE — 93306 TTE W/DOPPLER COMPLETE: CPT | Performed by: INTERNAL MEDICINE

## 2024-10-16 PROCEDURE — 25510000001 PERFLUTREN 6.52 MG/ML SUSPENSION 2 ML VIAL: Performed by: INTERNAL MEDICINE

## 2024-10-16 PROCEDURE — 93306 TTE W/DOPPLER COMPLETE: CPT

## 2024-10-16 PROCEDURE — 85027 COMPLETE CBC AUTOMATED: CPT | Performed by: INTERNAL MEDICINE

## 2024-10-16 PROCEDURE — 80048 BASIC METABOLIC PNL TOTAL CA: CPT | Performed by: INTERNAL MEDICINE

## 2024-10-16 PROCEDURE — 93005 ELECTROCARDIOGRAM TRACING: CPT | Performed by: INTERNAL MEDICINE

## 2024-10-16 RX ORDER — ACETAMINOPHEN 325 MG/1
650 TABLET ORAL EVERY 4 HOURS PRN
COMMUNITY
Start: 2024-10-16

## 2024-10-16 RX ADMIN — ASPIRIN 81 MG: 81 TABLET, COATED ORAL at 08:37

## 2024-10-16 RX ADMIN — PANTOPRAZOLE SODIUM 40 MG: 40 TABLET, DELAYED RELEASE ORAL at 06:23

## 2024-10-16 RX ADMIN — ACETAMINOPHEN 325MG 650 MG: 325 TABLET ORAL at 06:22

## 2024-10-16 RX ADMIN — HYDROXYZINE HYDROCHLORIDE 25 MG: 25 TABLET ORAL at 08:37

## 2024-10-16 RX ADMIN — PERFLUTREN 2 ML: 6.52 INJECTION, SUSPENSION INTRAVENOUS at 08:13

## 2024-10-16 RX ADMIN — FERROUS SULFATE TAB 325 MG (65 MG ELEMENTAL FE) 325 MG: 325 (65 FE) TAB at 08:37

## 2024-10-16 RX ADMIN — LEVOTHYROXINE SODIUM 50 MCG: 88 TABLET ORAL at 06:22

## 2024-10-16 NOTE — PLAN OF CARE
Problem: Adult Inpatient Plan of Care  Goal: Plan of Care Review  Outcome: Progressing  Flowsheets (Taken 10/16/2024 0551)  Progress: no change  Plan of Care Reviewed With: patient  Goal: Patient-Specific Goal (Individualized)  Outcome: Progressing  Goal: Absence of Hospital-Acquired Illness or Injury  Outcome: Progressing  Intervention: Identify and Manage Fall Risk  Description: Perform standard risk assessment on admission using a validated tool or comprehensive approach appropriate to the patient; reassess fall risk frequently, with change in status or transfer to another level of care.  Communicate risk to interprofessional healthcare team; ensure fall risk visible cue.  Determine need for increased observation, equipment and environmental modification, as well as use of supportive, nonskid footwear.  Adjust safety measures to individual needs and identified risk factors.  Reinforce the importance of active participation with fall risk prevention, safety, and physical activity with the patient and family.  Perform regular intentional rounding to assess need for position change, pain assessment and personal needs, including assistance with toileting.  Recent Flowsheet Documentation  Taken 10/16/2024 0445 by Ravi Peterson RN  Safety Promotion/Fall Prevention:   assistive device/personal items within reach   activity supervised   elopement precautions   clutter free environment maintained   fall prevention program maintained   lighting adjusted   muscle strengthening facilitated   mobility aid in reach   nonskid shoes/slippers when out of bed   safety round/check completed   room organization consistent  Taken 10/16/2024 0200 by Ravi Peterson, RN  Safety Promotion/Fall Prevention:   activity supervised   assistive device/personal items within reach   clutter free environment maintained   fall prevention program maintained   lighting adjusted   mobility aid in reach   nonskid shoes/slippers when out of bed    muscle strengthening facilitated   room organization consistent   safety round/check completed  Taken 10/16/2024 0000 by Ravi Peterson RN  Safety Promotion/Fall Prevention:   assistive device/personal items within reach   activity supervised   fall prevention program maintained   clutter free environment maintained   lighting adjusted   mobility aid in reach   room organization consistent   safety round/check completed   nonskid shoes/slippers when out of bed   muscle strengthening facilitated  Taken 10/15/2024 2228 by Ravi Peterson RN  Safety Promotion/Fall Prevention:   assistive device/personal items within reach   activity supervised   clutter free environment maintained   fall prevention program maintained   lighting adjusted   mobility aid in reach   nonskid shoes/slippers when out of bed   muscle strengthening facilitated   safety round/check completed   room organization consistent  Taken 10/15/2024 2045 by Ravi Peterson RN  Safety Promotion/Fall Prevention:   activity supervised   assistive device/personal items within reach   clutter free environment maintained   fall prevention program maintained   mobility aid in reach   lighting adjusted   nonskid shoes/slippers when out of bed   room organization consistent   safety round/check completed  Intervention: Prevent Skin Injury  Description: Perform a screening for skin injury risk, such as pressure or moisture-associated skin damage on admission and at regular intervals throughout hospital stay.  Keep all areas of skin (especially folds) clean and dry.  Maintain adequate skin hydration.  Relieve and redistribute pressure and protect bony prominences and skin at risk for injury; implement measures based on patient-specific risk factors.  Match turning and repositioning schedule to clinical condition.  Encourage weight shift frequently; assist with reposition if unable to complete independently.  Float heels off bed; avoid pressure on the Achilles  tendon.  Keep skin free from extended contact with medical devices.  Optimize nutrition and hydration.  Encourage functional activity and mobility, as early as tolerated.  Use aids (e.g., slide boards, mechanical lift) during transfer.  Recent Flowsheet Documentation  Taken 10/16/2024 0445 by Ravi Peterson RN  Body Position: position changed independently  Taken 10/16/2024 0200 by Ravi Peterson RN  Body Position: position changed independently  Taken 10/15/2024 2228 by Ravi Peterson RN  Body Position: position changed independently  Taken 10/15/2024 2045 by Ravi Peterson RN  Body Position: position changed independently  Intervention: Prevent and Manage VTE (Venous Thromboembolism) Risk  Description: Assess for VTE (venous thromboembolism) risk.  Promote early mobilization; encourage both active and passive leg exercises, if unable to ambulate.  Initiate and maintain compression or other therapy, as indicated, based on identified risk in accordance with organizational protocol and provider order.  Recognize the patient's individual risk for bleeding before initiating pharmacologic thromboprophylaxis.  Recent Flowsheet Documentation  Taken 10/15/2024 2045 by Ravi Peterson RN  VTE Prevention/Management:   bilateral   compression stockings off  Intervention: Prevent Infection  Description: Maintain skin and mucous membrane integrity; promote hand, oral and pulmonary hygiene.  Optimize fluid balance, nutrition, sleep and glycemic control to maximize infection resistance.  Identify potential sources of infection early to prevent or mitigate progression of infection (e.g., wound, lines, devices).  Evaluate ongoing need for invasive devices; remove promptly when no longer indicated.  Review vaccination status.  Recent Flowsheet Documentation  Taken 10/16/2024 0445 by Ravi Peterson RN  Infection Prevention:   visitors restricted/screened   single patient room provided   rest/sleep promoted   personal protective equipment  utilized   hand hygiene promoted   equipment surfaces disinfected   environmental surveillance performed   cohorting utilized  Taken 10/16/2024 0200 by Ravi Peterson RN  Infection Prevention:   visitors restricted/screened   single patient room provided   rest/sleep promoted   personal protective equipment utilized   hand hygiene promoted   equipment surfaces disinfected   environmental surveillance performed   cohorting utilized  Taken 10/16/2024 0000 by Ravi Peterson RN  Infection Prevention:   visitors restricted/screened   single patient room provided   rest/sleep promoted   personal protective equipment utilized   equipment surfaces disinfected   cohorting utilized   environmental surveillance performed   hand hygiene promoted  Taken 10/15/2024 2228 by Ravi Peterson RN  Infection Prevention:   visitors restricted/screened   single patient room provided   rest/sleep promoted   personal protective equipment utilized   hand hygiene promoted   equipment surfaces disinfected   environmental surveillance performed   cohorting utilized  Taken 10/15/2024 2045 by Raiv Peterson RN  Infection Prevention:   visitors restricted/screened   single patient room provided   hand hygiene promoted   equipment surfaces disinfected   environmental surveillance performed   cohorting utilized   rest/sleep promoted   personal protective equipment utilized  Goal: Optimal Comfort and Wellbeing  Outcome: Progressing  Intervention: Provide Person-Centered Care  Description: Use a family-focused approach to care; encourage support system presence and participation.  Develop trust and rapport by proactively providing information, encouraging questions, addressing concerns and offering reassurance.  Acknowledge emotional response to hospitalization.  Recognize and utilize personal coping strategies and strengths; develop goals via shared decision-making.  Honor spiritual and cultural preferences.  Recent Flowsheet Documentation  Taken  10/16/2024 0445 by Ravi Peterson RN  Trust Relationship/Rapport:   thoughts/feelings acknowledged   reassurance provided   questions encouraged   empathic listening provided   emotional support provided   care explained   choices provided   questions answered  Taken 10/15/2024 2045 by Ravi Peterson RN  Trust Relationship/Rapport:   thoughts/feelings acknowledged   reassurance provided   questions encouraged   questions answered   empathic listening provided   emotional support provided   care explained   choices provided  Goal: Readiness for Transition of Care  Outcome: Progressing   Goal Outcome Evaluation:  Plan of Care Reviewed With: patient        Progress: no change

## 2024-10-16 NOTE — PROGRESS NOTES
"[facility] Progress Note       Encounter Date:10/16/24  Patient:Michelle Cardona  :1950  MRN:[id number]      Chief Complaint: Follow up TAVR      Subjective:        Doing ok, still a little short of breath.    Review of Systems:  Review of Systems   Constitutional: Positive for malaise/fatigue.   Cardiovascular:  Positive for chest pain.   Respiratory:  Positive for shortness of breath.        Medications:  Scheduled Meds:  aspirin, 81 mg, Oral, Daily  atorvastatin, 40 mg, Oral, Q PM  ferrous sulfate, 325 mg, Oral, Daily With Breakfast  hydrOXYzine, 25 mg, Oral, Daily  levothyroxine, 50 mcg, Oral, QAM  pantoprazole, 40 mg, Oral, Q AM    Continuous Infusions:  lactated ringers, 9 mL/hr  sodium chloride, 30 mL/hr    PRN Meds:    acetaminophen    HYDROcodone-acetaminophen    melatonin    nitroglycerin    ondansetron ODT **OR** ondansetron    sodium chloride         Objective:       Vitals:    10/16/24 0025 10/16/24 0437 10/16/24 0721 10/16/24 0824   BP: 113/77 112/52 104/55 111/69   BP Location: Right arm Right arm  Left arm   Patient Position: Lying Lying  Sitting   Pulse: 75 83 70 81   Resp: 16 16  16   Temp: 98 °F (36.7 °C) 97.7 °F (36.5 °C)  99.2 °F (37.3 °C)   TempSrc: Oral Oral  Oral   SpO2:    98%   Weight:   71.2 kg (157 lb)    Height:   167.6 cm (66\")            Physical Exam:  Constitutional: Well appearing, well developed, no acute distress   HENT: Oropharynx clear and membrane moist  Eyes: Normal conjunctiva, no sclera icterus.  Neck: Supple, no carotid bruit bilaterally.  Cardiovascular: Regular rate and rhythm, Early peaking systolic murmur over the right upper sternal border, No bilateral lower extremity edema.  Pulmonary: Normal respiratory effort, normal lung sounds, no wheezing.  Abdominal: Soft, nontender, no hepatosplenomegaly, liver is non-pulsatile.  Neurological: Alert and orient x 3.   Skin: Warm, dry, no ecchymosis, no rash. Np hematoma  Psych: Appropriate mood " "and affect. Normal judgment and insight.           Lab Review:   Results from last 7 days   Lab Units 10/16/24  0304   SODIUM mmol/L 137   POTASSIUM mmol/L 3.7   CHLORIDE mmol/L 106   CO2 mmol/L 22.1   BUN mg/dL 9   CREATININE mg/dL 0.91   GLUCOSE mg/dL 93   CALCIUM mg/dL 9.1         Results from last 7 days   Lab Units 10/16/24  0304 10/15/24  1227   WBC 10*3/mm3 8.80  --    HEMOGLOBIN g/dL 7.8*  --    HEMOGLOBIN, POC g/dL  --  10.2*   HEMATOCRIT % 27.3*  --    HEMATOCRIT POC %  --  30*   PLATELETS 10*3/mm3 253  --                    Invalid input(s): \"LDLCALC\"            Echocardiogram 10/15/2024 images reviewed myself:  Normal-appearing bioprosthetic aortic valve with no significant valvular regurgitation  Normal left ventricular function.  Trivial to small pericardial effusion no evidence of tamponade IVC is actually collapsed.    TAVR 10/15/2024:  Peak-to-peak gradient across the aortic valve pre-TAVR was 80 mmHg with LVEDP of 20 mmHg.  Successful placement of 26 mm MAXI Ultra aortic valve bioprosthesis with no paravalvular leak.  Successful insertion and removal of [place] embolic protection device via right radial approac  Echocardiogram demonstrated calculated valve area of 2.2 cm2 with mean gradient of 5 mmHg and no AI.       Assessment:          Diagnosis Plan   1. Nonrheumatic aortic valve stenosis  Ambulatory Referral to Cardiac Rehab      2. Aortic valve stenosis, severe  Cardiac Catheterization/Vascular Study    Cardiac Catheterization/Vascular Study             Plan:       Ms. Cardona is a 73 y.o. woman with past medical history notable for non-rheumatic aortic stenosis and hypothyroidism who presents for elective TAVR.  She had placement of 26 mm MAXI ultra bioprosthetic aortic valve 10/15/2024.  She underwent this via right transfemoral approach.  She is doing well.  Her access sites are well-healed.  Her echocardiogram today shows normal valve function.  She is also fairly short of breath " coming in and her filling pressures were elevated consistent with acute on chronic diastolic congestive heart failure.  She does not need any IV diuretics to address this as she is actually a little bit volume depleted and treatment was best done addressing her valvular disease which which is the etiology of her heart failure.  She seems to be doing better and hopefully make a better recovery now that her valve disease is addressed.      Nonrheumatic aortic valve stenosis:  Status post 26 mm MAXI ultra bioprosthetic aortic valve 10/15/2024 via right transfemoral approach  Echocardiogram today shows normal valve function    Acute on chronic diastolic congestive heart failure due to valvular heart disease:  Would hold off on diuretics given echocardiogram findings showing collapsed IVC  Treatment of choice is addressing her valvular heart disease which was done with TAVR                 George Montano MD  [facility]  10/16/24  09:58 EDT

## 2024-10-16 NOTE — CONSULTS
Met with patient to discuss the benefits of cardiac rehab. Provided phase II information along with the contact information for cardiac rehab at [facility]. Pt is not sure if she will be using home health upon discharge. Explained if receiving home health would not be able to attend cardiac rehab until finished with home health.

## 2024-10-16 NOTE — DISCHARGE SUMMARY
[facility] Cardiac Surgery Discharge Summary    Date of Admission: 10/15/2024  Date of Discharge:  10/16/2024    Discharge Diagnosis:   Severe symptomatic aortic stenosis-status post TAVR (26 mm MAXI ultra valve) Dr. Schumacher/Dr. Montano.  Chronic diastolic heart failure  Hypertension  Hyperlipidemia  GERD  Hypothyroid    Presenting Problem/History of Present Illness  Aortic valve stenosis, severe [I35.0]  Aortic valve stenosis, nonrheumatic [I35.0]     Hospital Course  Patient is a 73 y.o. female presented with with above medical history and worsening symptoms from her aortic stenosis.  After having appropriate preoperative workup she was deemed a candidate for TAVR.  She was admitted the morning of the procedure.  On 10/15/2024 she underwent TAVR using a 26 mm sapient transcatheter heart valve by Dr. Schumacher and Dr. Montano.  She tolerated the procedure was transferred to recovery.  Full details can be found in operative report.  She remained stable and was able to be transferred to the stepdown unit shortly after the procedure on postoperative day 0. She continued to doing well overnight and the postop TAVR echo was completed on postoperative day 1. Hgb 7.8, asymptomatic, home iron started.  Echo was reviewed by cardiology and showed normal valve function.  She was ready for discharge.  Plan discharge home with family.  At time of discharge she is hemodynamically stable, ambulating well, and on room air. Patient was provided with appropriate discharge education. She was instructed to call office with any questions or concerns.  She will follow-up as directed by the TAVR coordinator.       Procedures Performed  Procedure(s):  TRANSFEMORAL TRANSCATHETER AORTIC VALVE REPLACEMENT with intraoperative transthoracic echocardiogram  Transfemoral Transcatheter Aortic Valve Replacement with intraoperative transthoracic echocardiogram       Consults:   Consults       No orders found for last 30 day(s).             Pertinent Test Results:    Lab Results   Component Value Date    WBC 8.80 10/16/2024    HGB 7.8 (L) 10/16/2024    HCT 27.3 (L) 10/16/2024    MCV 72.8 (L) 10/16/2024     10/16/2024      Lab Results   Component Value Date    GLUCOSE 93 10/16/2024    CALCIUM 9.1 10/16/2024     10/16/2024    K 3.7 10/16/2024    CO2 22.1 10/16/2024     10/16/2024    BUN 9 10/16/2024    CREATININE 0.91 10/16/2024    BCR 9.9 10/16/2024    ANIONGAP 8.9 10/16/2024     Lab Results   Component Value Date    INR 1.09 08/29/2024    PROTIME 14.3 08/29/2024     Post-op TAVR Echo  Left Ventricle Left ventricular systolic function is normal. Calculated left ventricular EF = 64%     Normal left ventricular cavity size and wall thickness noted. All left ventricular wall segments contract normally. Left ventricular diastolic function is consistent with (grade I) impaired relaxation.   Right Ventricle Normal right ventricular cavity size and systolic function noted.   Left Atrium Normal left atrial cavity size noted.   Right Atrium Normal right atrial cavity size noted.   Aortic Valve No aortic valve regurgitation is present. Aortic valve area is 1.66 cm2. Peak velocity of the flow distal to the aortic valve is 225.1 cm/s. Aortic valve mean pressure gradient is 11.1 mmHg. Aortic valve dimensionless index is 0.50 . There is a 26 mm TAVR valve present. [place]   Mitral Valve Mild mitral annular calcification is present. Mild mitral valve regurgitation is present. No significant mitral valve stenosis is present.   Tricuspid Valve The tricuspid valve is grossly normal in structure. Trace tricuspid valve regurgitation is present. Estimated right ventricular systolic pressure from tricuspid regurgitation is moderately elevated (45-55 mmHg). Calculated right ventricular systolic pressure from tricuspid regurgitation is 46.8 mmHg.   Pulmonic Valve The pulmonic valve is grossly normal in structure.   Greater Vessels No dilation of  the aortic root is present.   Pericardium There is no evidence of pericardial effusion. .       Condition on Discharge:    stable    Vital Signs  Temp:  [97.7 °F (36.5 °C)-99.7 °F (37.6 °C)] 99.7 °F (37.6 °C)  Heart Rate:  [70-86] 86  Resp:  [16] 16  BP: ()/(44-77) 101/66      Discharge Disposition  Home or Self Care    Discharge Medications     Discharge Medications        New Medications        Instructions Start Date   acetaminophen 325 MG tablet  Commonly known as: TYLENOL   650 mg, Oral, Every 4 Hours PRN             Continue These Medications        Instructions Start Date   aspirin 81 MG EC tablet   81 mg, Daily      atorvastatin 40 MG tablet  Commonly known as: LIPITOR   40 mg, Every Evening      D-3-5 125 MCG (5000 UT) capsule capsule  Generic drug: vitamin D3   1 capsule, Daily      FeroSul 325 (65 Fe) MG tablet  Generic drug: ferrous sulfate   325 mg, Daily With Breakfast      hydrOXYzine 25 MG tablet  Commonly known as: ATARAX   25 mg, Daily      levothyroxine 50 MCG tablet  Commonly known as: SYNTHROID, LEVOTHROID   50 mcg, Daily      melatonin 5 MG tablet tablet   5 mg      omeprazole 40 MG capsule  Commonly known as: priLOSEC   1 capsule, Every Morning             Stop These Medications      chlorhexidine 0.12 % solution  Commonly known as: PERIDEX     mupirocin 2 % ointment  Commonly known as: BACTROBAN              Discharge Diet:   Heart healthy    Activity at Discharge:   Activity Instructions       Activity as Tolerated      Driving Restrictions      Type of Restriction: Driving    Driving Restrictions: No Driving (Time Limited)    Length: 1 Week    Lifting Restrictions      Type of Restriction: Lifting    Lifting Restrictions: Avoid Straining to Lift    Length of Lifting Restriction: 1 week    Measure Weight      Daily weight, notify if over 3 lbs in one day   Additional Activity Instructions:    As tolerated           Shower daily. Clean incisions with warm water and antibacterial soap  only. Do not put any lotion or ointments on incisions.   Ambulate for 10 minutes at least 3 times a day.   No heavy lifting > 10lbs until seen in office.    Take all medications as prescribed.     Follow-up Appointments:  Future Appointments   Date Time Provider Department Center   11/14/2024  1:00 PM [facility] 3 [facility]   11/14/2024  2:30 PM George Montano MD [facility]   9/11/2025 10:00 AM [facility] 5 [facility]   9/11/2025 12:30 PM George Montano MD [facility]     Additional Instructions for the Follow-ups that You Need to Schedule       Ambulatory Referral to Cardiac Rehab   As directed      Discharge Follow-up with PCP   As directed       Currently Documented PCP:    Eleni Tinajero APRN    PCP Phone Number:    [phone]     Follow Up Details: 1 month        Discharge Follow-up with Specified Provider: Cardiology; 1 Week   As directed      To: Cardiology   Follow Up: 1 Week                  Test Results Pending at Discharge:  none       Darío Mijares PA-C  10/16/24  16:03 EDT

## 2024-10-16 NOTE — CASE MANAGEMENT/SOCIAL WORK
Continued Stay Note  [facility]     Patient Name: Michelle Cardona  MRN: [id number]  Today's Date: 10/16/2024    Admit Date: 10/15/2024    Plan: Plan home with family support.  RODDY Lawson RN   Discharge Plan       Row Name 10/16/24 1214       Plan    Plan Plan home with family support.  RODDY Lawson RN    Plan Comments FACE SHEET VERIFIED/ IM LETTER SIGNED.  Spoke with pt at bedside.  Pt's PCP is AMANDA Gonzales.  Pt lives alone in a mobile home. Pt is independent with ADLs. Pt has a rollator for home use if needed. Pt gets her prescriptions at [facility] in [place], [place].  Pt denies any issues affording medications. Pt is not current with HH. Pt has not been in SNF.  Pt denies any discharge needs. Pt's son (Lars) and his girlfriend (Jeannette Rivera [phone]) and son ( Pradip) and his girlfriend ( Klye Contreras [phone]) will assist pt at home if needed and Jeannette will transport her home. Plan home with family support.  RODDY Lawson RN                   Discharge Codes    No documentation.                 Expected Discharge Date and Time       Expected Discharge Date Expected Discharge Time    Oct 16, 2024               Regina Lawson RN

## 2024-10-16 NOTE — CASE MANAGEMENT/SOCIAL WORK
Discharge Planning Assessment  [facility]     Patient Name: Michelle Cardona  MRN: [id number]  Today's Date: 10/16/2024    Admit Date: 10/15/2024    Plan: Plan home with family support.  RODDY Lawson RN   Discharge Needs Assessment       Row Name 10/16/24 1212       Living Environment    People in Home alone    Current Living Arrangements home    Potentially Unsafe Housing Conditions none    In the past 12 months has the electric, gas, oil, or water company threatened to shut off services in your home? No    Primary Care Provided by self    Provides Primary Care For no one, unable/limited ability to care for self    Family Caregiver if Needed child(demarco), adult    Family Caregiver Names Son Hawa) and his girlfriend (Jeannette Rivera [phone]) and Son ( Pradip) and his girlfriend ( Kyle Contreras [phone])    Quality of Family Relationships helpful;involved;supportive    Able to Return to Prior Arrangements yes    Living Arrangement Comments Pt lives alone in a mobile home.       Resource/Environmental Concerns    Resource/Environmental Concerns none    Transportation Concerns none       Transportation Needs    In the past 12 months, has lack of transportation kept you from medical appointments or from getting medications? no    In the past 12 months, has lack of transportation kept you from meetings, work, or from getting things needed for daily living? No       Food Insecurity    Within the past 12 months, you worried that your food would run out before you got the money to buy more. Never true    Within the past 12 months, the food you bought just didn't last and you didn't have money to get more. Never true       Transition Planning    Patient/Family Anticipates Transition to home    Patient/Family Anticipated Services at Transition none    Transportation Anticipated family or friend will provide       Discharge Needs Assessment    Readmission Within the Last 30 Days no previous admission in last 30  days    Equipment Currently Used at Home rollator    Concerns to be Addressed no discharge needs identified;denies needs/concerns at this time    Anticipated Changes Related to Illness none    Equipment Needed After Discharge rollator                   Discharge Plan       Row Name 10/16/24 1214       Plan    Plan Plan home with family support.  RODDY Lawson RN    Plan Comments FACE SHEET VERIFIED/ IM LETTER SIGNED.  Spoke with pt at bedside.  Pt's PCP is AMANDA Gonzales.  Pt lives alone in a mobile home. Pt is independent with ADLs. Pt has a rollator for home use if needed. Pt gets her prescriptions at [facility].  Pt denies any issues affording medications. Pt is not current with . Pt has not been in SNF.  Pt denies any discharge needs. Pt's son (Lars) and his girlfriend (Jeannette Rivera [phone]) and son ( Pradip) and his girlfriend ( Kyle Contreras [phone]) will assist pt at home if needed and Jeannette will transport her home. Plan home with family support.  RODDY Lawson RN                  Continued Care and Services - Admitted Since 10/15/2024    No active coordination exists for this encounter.       Expected Discharge Date and Time       Expected Discharge Date Expected Discharge Time    Oct 16, 2024            Demographic Summary       Row Name 10/16/24 1211       General Information    Admission Type inpatient    Arrived From emergency department    Required Notices Provided Important Message from Medicare    Referral Source admission list    Reason for Consult discharge planning    Preferred Language English                   Functional Status       Row Name 10/16/24 1212       Functional Status    Usual Activity Tolerance good    Current Activity Tolerance moderate       Functional Status, IADL    Medications independent    Meal Preparation independent    Housekeeping independent    Laundry assistive person    Shopping assistive person       Mental Status    General  Appearance WDL WDL                   Psychosocial    No documentation.                  Abuse/Neglect    No documentation.                  Legal    No documentation.                  Substance Abuse    No documentation.                  Patient Forms    No documentation.                     Regina Lawson RN

## 2024-10-17 LAB
BH BB BLOOD EXPIRATION DATE: NORMAL
BH BB BLOOD TYPE BARCODE: 600
BH BB BLOOD TYPE BARCODE: 600
BH BB BLOOD TYPE BARCODE: 8400
BH BB BLOOD TYPE BARCODE: 8400
BH BB DISPENSE STATUS: NORMAL
BH BB PRODUCT CODE: NORMAL
BH BB UNIT NUMBER: NORMAL
CROSSMATCH INTERPRETATION: NORMAL
UNIT  ABO: NORMAL
UNIT  RH: NORMAL

## 2024-10-17 NOTE — CASE MANAGEMENT/SOCIAL WORK
Case Management Discharge Note      Final Note: Pt discharged home on 10/16 with family to assist pt if needed.   RODDY Lawson RN         Selected Continued Care - Discharged on 10/16/2024 Admission date: 10/15/2024 - Discharge disposition: Home or Self Care      Destination    No services have been selected for the patient.                Durable Medical Equipment    No services have been selected for the patient.                Dialysis/Infusion    No services have been selected for the patient.                Home Medical Care    No services have been selected for the patient.                Therapy    No services have been selected for the patient.                Community Resources    No services have been selected for the patient.                Community & DME    No services have been selected for the patient.                    Transportation Services  Private: Car    Final Discharge Disposition Code: 01 - home or self-care

## 2024-11-13 PROBLEM — Z95.2 S/P TAVR (TRANSCATHETER AORTIC VALVE REPLACEMENT): Status: ACTIVE | Noted: 2024-11-13

## 2024-11-13 NOTE — PROGRESS NOTES
[facility] New Patient Office Note  [facility]     Encounter Date:24  Patient:Michelle Cardona  :1950  MRN:[id number]    Referring Provider: Goyo Hinojosa MD    Chief Complaint:   Chief Complaint   Patient presents with    1 month post TAVR       History of Presenting Illness:      Ms. Cardona is a 73 y.o. woman with past medical history notable for non-rheumatic aortic stenosis that is post TAVR and hypothyroidism who presents for scheduled follow-up after recent TAVR.  Overall she is doing great breathing much better she is accompanied by her daughters today.  She did have some low blood counts the day after her procedure.  Has not gotten follow-up blood work yet.  Been ordered but they forgot to get it done.  Otherwise no complaints      Review of Systems:  Review of Systems   Constitutional: Negative.   HENT: Negative.     Eyes: Negative.    Cardiovascular: Negative.    Respiratory: Negative.     Endocrine: Negative.    Hematologic/Lymphatic: Negative.    Skin: Negative.    Musculoskeletal: Negative.    Gastrointestinal: Negative.    Genitourinary: Negative.    Neurological: Negative.    Psychiatric/Behavioral: Negative.     Allergic/Immunologic: Negative.        Current Outpatient Medications on File Prior to Visit   Medication Sig Dispense Refill    acetaminophen (TYLENOL) 325 MG tablet Take 2 tablets by mouth Every 4 (Four) Hours As Needed for Mild Pain.      aspirin 81 MG EC tablet Take 1 tablet by mouth Daily.      atorvastatin (LIPITOR) 40 MG tablet Take 1 tablet by mouth Every Evening.      D-3-5 125 MCG (5000 UT) capsule capsule Take 1 capsule by mouth Daily.      FeroSul 325 (65 Fe) MG tablet Take 1 tablet by mouth Daily With Breakfast.      hydrOXYzine (ATARAX) 25 MG tablet Take 1 tablet by mouth As Needed.      levothyroxine (SYNTHROID, LEVOTHROID) 50 MCG tablet Take 1 tablet by mouth Daily.      melatonin 5 MG tablet tablet Take 1 tablet by  mouth.      omeprazole (priLOSEC) 40 MG capsule Take 1 capsule by mouth Every Morning.       No current facility-administered medications on file prior to visit.       No Known Allergies    Past Medical History:   Diagnosis Date    Disease of thyroid gland     GERD (gastroesophageal reflux disease)     Heart murmur        Past Surgical History:   Procedure Laterality Date    AORTIC VALVE REPAIR/REPLACEMENT N/A 10/15/2024    Procedure: TRANSFEMORAL TRANSCATHETER AORTIC VALVE REPLACEMENT with intraoperative transthoracic echocardiogram;  Surgeon: Rik Schumacher MD;  Location: [facility];  Service: Cardiothoracic;  Laterality: N/A;    AORTIC VALVE REPAIR/REPLACEMENT N/A 10/15/2024    Procedure: Transfemoral Transcatheter Aortic Valve Replacement with intraoperative transthoracic echocardiogram;  Surgeon: George Montano MD;  Location: [facility];  Service: Cardiovascular;  Laterality: N/A;    CARDIAC CATHETERIZATION      CARDIAC CATHETERIZATION N/A 8/29/2024    Procedure: Left Heart Cath;  Surgeon: BAY Hinojosa MD;  Location: [facility] CATH INVASIVE LOCATION;  Service: Cardiology;  Laterality: N/A;    CARDIAC CATHETERIZATION Left 8/29/2024    Procedure: Cardiac Catheterization/ coronaries ONLY;  Surgeon: BAY Hinojosa MD;  Location: [facility] CATH INVASIVE LOCATION;  Service: Cardiology;  Laterality: Left;    COLONOSCOPY         Social History     Socioeconomic History    Marital status:    Tobacco Use    Smoking status: Former     Types: Cigarettes    Smokeless tobacco: Never   Vaping Use    Vaping status: Never Used   Substance and Sexual Activity    Alcohol use: Yes     Comment: social    Drug use: Never    Sexual activity: Defer       Family History   Problem Relation Age of Onset    No Known Problems Mother     Heart attack Father     Heart attack Brother     Malig Hyperthermia Neg Hx        The following portions of the patient's history were reviewed and updated as appropriate: allergies,  "current medications, past family history, past medical history, past social history, past surgical history and problem list.       Objective:       Vitals:    11/14/24 1339   BP: 116/73   BP Location: Left arm   Patient Position: Sitting   Pulse: 71   Weight: 71.2 kg (157 lb)   Height: 167.6 cm (66\")         Body mass index is 25.34 kg/m².    Physical Exam:  Constitutional: Well appearing, Well-developed, No acute distress   HENT: Oropharynx clear and membrane moist  Eyes: Normal conjunctiva, no sclera icterus.  Neck: Supple, no carotid bruit bilaterally.  Cardiovascular: Regular rate and rhythm, No Murmur, No bilateral lower extremity edema.  Pulmonary: Normal respiratory effort, normal lung sounds, no wheezing.  Neurological: Alert and orient x 3.   Skin: Warm, dry, no ecchymosis, no rash.  Psych: Appropriate mood and affect. Normal judgment and insight.       Lab Results   Component Value Date     10/16/2024     08/29/2024    K 3.7 10/16/2024    K 4.0 08/29/2024     10/16/2024     08/29/2024    CO2 22.1 10/16/2024    CO2 25.1 08/29/2024    BUN 9 10/16/2024    BUN 12 08/29/2024    CREATININE 0.91 10/16/2024    CREATININE 1.00 09/27/2024    GLUCOSE 93 10/16/2024    GLUCOSE 90 08/29/2024    CALCIUM 9.1 10/16/2024    CALCIUM 9.7 08/29/2024     Lab Results   Component Value Date    WBC 8.80 10/16/2024    WBC 5.46 08/29/2024    HGB 7.8 (L) 10/16/2024    HGB 10.2 (L) 10/15/2024    HCT 27.3 (L) 10/16/2024    HCT 30 (L) 10/15/2024    MCV 72.8 (L) 10/16/2024    MCV 73.3 (L) 08/29/2024     10/16/2024     08/29/2024     No results found for: \"CHOL\", \"TRIG\", \"HDL\", \"LDL\"  No results found for: \"PROBNP\", \"BNP\"  No results found for: \"CKTOTAL\", \"CKMB\", \"CKMBINDEX\", \"TROPONINI\", \"TROPONINT\"  No results found for: \"TSH\"      ECG 12 Lead    Date/Time: 11/14/2024 2:55 PM  Performed by: George Montano MD    Authorized by: George Montano MD  Comparison: compared with previous ECG from " 10/16/2024  Similar to previous ECG  Rhythm: sinus rhythm  Conduction: non-specific intraventricular conduction delay          Echocardiogram 11/13/2024 with images reviewed by myself:  Normal left ventricular function ejection fraction 55 to 60%  Appropriate valve function mean gradient approximately 12 mmHg with a mean gradient     Echocardiogram 10/15/2024:  Normal-appearing bioprosthetic aortic valve with no significant valvular regurgitation  Normal left ventricular function.  Trivial to small pericardial effusion no evidence of tamponade IVC is actually collapsed.     TAVR 10/15/2024:  Peak-to-peak gradient across the aortic valve pre-TAVR was 80 mmHg with LVEDP of 20 mmHg.  Successful placement of 26 mm MAXI Ultra aortic valve bioprosthesis with no paravalvular leak.  Successful insertion and removal of [place] embolic protection device via right radial approac  Echocardiogram demonstrated calculated valve area of 2.2 cm2 with mean gradient of 5 mmHg and no AI.     Cardiac Catheterization 8/29/2024:  Left main-large, angiographically normal, bifurcates into the LAD and circumflex  LAD-large, wraps the apex, mild luminal irregularities throughout, no focal stenosis  Left circumflex-large, dominant, mild luminal regularities  RCA-small, mild nonobstructive disease     Echocardiogram 8/12/2024 [facility]:  Normal EF 55%  Severe aortic stenosis with mean gradient of 73 mmHg and peak velocity of 5.4 m/s    Echocardiogram 1/25/2024 [facility]:  Normal EF 55%  Moderate to severe aortic stenosis with mean gradient of 38 mmHg and peak velocity of 4.0 m/s          Assessment:          Diagnosis Plan   1. Aortic valve stenosis, nonrheumatic  CBC (No Diff)    ECG 12 Lead      2. S/P TAVR (transcatheter aortic valve replacement)  CBC (No Diff)    ECG 12 Lead               Plan:       Ms. Cardona is a 73 y.o.  woman with past medical history notable for non-rheumatic aortic stenosis that is post  TAVR and hypothyroidism who presents for scheduled follow-up after recent TAVR.  Overall she is doing great she does have some chest wall soreness that is focal over the left chest wall but not exertional in nature and tender to palpation.  No rash in that area I do think it has anything to do with the valve clinically she is doing great she does need a follow-up CBC these were ordered but she had not done them yet repeat CBC ordered.  She did miss her follow-up appointment a week after TAVR with the nurse practitioner with Dr. Hinojosa we will work on arranging appropriate follow-up in the coming months with her primary cardiology team       Nonrheumatic aortic valve stenosis:  Status post 26 mm MAXI ultra bioprosthetic aortic valve 10/15/2024 via right transfemoral approach  Echocardiogram today shows normal valve function  NYHA class 2       Follow up:  1 year       Thank you for allowing me to participate in the care of Michelle FLAQUITO TilleyBrendan. Feel free to contact me directly with any further questions or concerns.    George Montano MD  [facility]  11/14/24  14:57 EST

## 2024-11-14 ENCOUNTER — OFFICE VISIT (OUTPATIENT)
Age: 74
End: 2024-11-14
Payer: MEDICARE

## 2024-11-14 ENCOUNTER — HOSPITAL ENCOUNTER (OUTPATIENT)
Dept: CARDIOLOGY | Facility: HOSPITAL | Age: 74
Discharge: HOME | End: 2024-11-14
Admitting: INTERNAL MEDICINE
Payer: MEDICARE

## 2024-11-14 VITALS
HEIGHT: 66 IN | HEART RATE: 71 BPM | WEIGHT: 157 LBS | DIASTOLIC BLOOD PRESSURE: 73 MMHG | SYSTOLIC BLOOD PRESSURE: 116 MMHG | BODY MASS INDEX: 25.23 KG/M2

## 2024-11-14 DIAGNOSIS — Z95.2 S/P TAVR (TRANSCATHETER AORTIC VALVE REPLACEMENT): ICD-10-CM

## 2024-11-14 DIAGNOSIS — I35.0 AORTIC VALVE STENOSIS, NONRHEUMATIC: Primary | ICD-10-CM

## 2024-11-14 DIAGNOSIS — I50.32 CHRONIC DIASTOLIC CONGESTIVE HEART FAILURE: ICD-10-CM

## 2024-11-14 DIAGNOSIS — I35.0 AORTIC VALVE STENOSIS, SEVERE: ICD-10-CM

## 2024-11-14 LAB
ASCENDING AORTA: 3.3 CM
BH CV ECHO MEAS - AO MAX PG: 21.3 MMHG
BH CV ECHO MEAS - AO MEAN PG: 12 MMHG
BH CV ECHO MEAS - AO ROOT DIAM: 2.4 CM
BH CV ECHO MEAS - AO V2 MAX: 231 CM/SEC
BH CV ECHO MEAS - AO V2 VTI: 39.1 CM
BH CV ECHO MEAS - AVA(I,D): 1.48 CM2
BH CV ECHO MEAS - EDV(CUBED): 91.1 ML
BH CV ECHO MEAS - EDV(MOD-SP2): 56.8 ML
BH CV ECHO MEAS - EDV(MOD-SP4): 37.4 ML
BH CV ECHO MEAS - EF(MOD-SP2): 59.7 %
BH CV ECHO MEAS - EF(MOD-SP4): 65.5 %
BH CV ECHO MEAS - ESV(CUBED): 17.6 ML
BH CV ECHO MEAS - ESV(MOD-SP2): 22.9 ML
BH CV ECHO MEAS - ESV(MOD-SP4): 12.9 ML
BH CV ECHO MEAS - FS: 42.2 %
BH CV ECHO MEAS - IVS/LVPW: 1 CM
BH CV ECHO MEAS - IVSD: 1.3 CM
BH CV ECHO MEAS - LA DIMENSION: 4.1 CM
BH CV ECHO MEAS - LAT PEAK E' VEL: 8.9 CM/SEC
BH CV ECHO MEAS - LV DIASTOLIC VOL/BSA (35-75): 20.6 CM2
BH CV ECHO MEAS - LV MASS(C)D: 222.6 GRAMS
BH CV ECHO MEAS - LV MAX PG: 3.3 MMHG
BH CV ECHO MEAS - LV MEAN PG: 2 MMHG
BH CV ECHO MEAS - LV SYSTOLIC VOL/BSA (12-30): 7.1 CM2
BH CV ECHO MEAS - LV V1 MAX: 91 CM/SEC
BH CV ECHO MEAS - LV V1 VTI: 18.4 CM
BH CV ECHO MEAS - LVIDD: 4.5 CM
BH CV ECHO MEAS - LVIDS: 2.6 CM
BH CV ECHO MEAS - LVOT AREA: 3.1 CM2
BH CV ECHO MEAS - LVOT DIAM: 2 CM
BH CV ECHO MEAS - LVPWD: 1.3 CM
BH CV ECHO MEAS - MED PEAK E' VEL: 6.4 CM/SEC
BH CV ECHO MEAS - MV A MAX VEL: 99 CM/SEC
BH CV ECHO MEAS - MV DEC SLOPE: 282.7 CM/SEC2
BH CV ECHO MEAS - MV DEC TIME: 0.24 SEC
BH CV ECHO MEAS - MV E MAX VEL: 73.4 CM/SEC
BH CV ECHO MEAS - MV E/A: 0.74
BH CV ECHO MEAS - MV MAX PG: 3.8 MMHG
BH CV ECHO MEAS - MV MEAN PG: 1 MMHG
BH CV ECHO MEAS - MV P1/2T: 90.6 MSEC
BH CV ECHO MEAS - MV V2 VTI: 31 CM
BH CV ECHO MEAS - MVA(P1/2T): 2.43 CM2
BH CV ECHO MEAS - MVA(VTI): 1.86 CM2
BH CV ECHO MEAS - RVDD: 2.3 CM
BH CV ECHO MEAS - SV(LVOT): 57.8 ML
BH CV ECHO MEAS - SV(MOD-SP2): 33.9 ML
BH CV ECHO MEAS - SV(MOD-SP4): 24.5 ML
BH CV ECHO MEAS - SVI(LVOT): 31.9 ML/M2
BH CV ECHO MEAS - SVI(MOD-SP2): 18.7 ML/M2
BH CV ECHO MEAS - SVI(MOD-SP4): 13.5 ML/M2
BH CV ECHO MEASUREMENTS AVERAGE E/E' RATIO: 9.59
LEFT ATRIUM VOLUME INDEX: 27.2 ML/M2

## 2024-11-14 PROCEDURE — 93306 TTE W/DOPPLER COMPLETE: CPT

## 2025-01-10 NOTE — TELEPHONE ENCOUNTER
Bactroban and Peridex have been called in to [facility] and family has been notified to pick them up prior to the TAVR procedure scheduled for 10/15/22   Statement Selected

## 2025-03-07 ENCOUNTER — OFFICE VISIT (OUTPATIENT)
Dept: CARDIOLOGY | Facility: CLINIC | Age: 75
End: 2025-03-07
Payer: MEDICARE

## 2025-03-07 VITALS
HEART RATE: 81 BPM | WEIGHT: 156 LBS | BODY MASS INDEX: 25.07 KG/M2 | HEIGHT: 66 IN | DIASTOLIC BLOOD PRESSURE: 65 MMHG | SYSTOLIC BLOOD PRESSURE: 108 MMHG

## 2025-03-07 DIAGNOSIS — I10 HYPERTENSION, ESSENTIAL: ICD-10-CM

## 2025-03-07 DIAGNOSIS — I50.32 CHRONIC DIASTOLIC CONGESTIVE HEART FAILURE: ICD-10-CM

## 2025-03-07 DIAGNOSIS — Z95.2 S/P TAVR (TRANSCATHETER AORTIC VALVE REPLACEMENT): ICD-10-CM

## 2025-03-07 DIAGNOSIS — I35.0 AORTIC VALVE STENOSIS, SEVERE: Primary | ICD-10-CM

## 2025-03-07 NOTE — PROGRESS NOTES
Chief Complaint  <9>Aortic valve stenosis, nonrheumatic; Heart Murmur; and Dizziness    Subjective            Michelle Cardona presents to [facility] CARDIOLOGY    Ms. Cardona is here for follow-up evaluation management of severe aortic stenosis status post TAVR, essential hypertension, mixed hyperlipidemia, chronic diastolic heart failure.  She is accompanied by her family.  She is very hard of hearing.  She has some atypical symptoms of soreness in the chest, left wrist pain, mild shortness of breath.  It is difficult to determine the details on the symptoms.  Follow-up echo recently showed normal TAVR function.  Her blood pressure has been controlled.  Cardiac catheterization in August showed no significant obstructive CAD.    PMH  Past Medical History:   Diagnosis Date    Disease of thyroid gland     GERD (gastroesophageal reflux disease)     Heart murmur          SURGICALHX  Past Surgical History:   Procedure Laterality Date    AORTIC VALVE REPAIR/REPLACEMENT N/A 10/15/2024    Procedure: TRANSFEMORAL TRANSCATHETER AORTIC VALVE REPLACEMENT with intraoperative transthoracic echocardiogram;  Surgeon: Rik Schumacher MD;  Location: [facility];  Service: Cardiothoracic;  Laterality: N/A;    AORTIC VALVE REPAIR/REPLACEMENT N/A 10/15/2024    Procedure: Transfemoral Transcatheter Aortic Valve Replacement with intraoperative transthoracic echocardiogram;  Surgeon: George Montano MD;  Location: [facility];  Service: Cardiovascular;  Laterality: N/A;    CARDIAC CATHETERIZATION      CARDIAC CATHETERIZATION N/A 8/29/2024    Procedure: Left Heart Cath;  Surgeon: BAY Hinojosa MD;  Location: [facility] CATH INVASIVE LOCATION;  Service: Cardiology;  Laterality: N/A;    CARDIAC CATHETERIZATION Left 8/29/2024    Procedure: Cardiac Catheterization/ coronaries ONLY;  Surgeon: BAY Hinojosa MD;  Location: [facility] CATH INVASIVE LOCATION;  Service: Cardiology;  Laterality: Left;    COLONOSCOPY       "    SOC  Social History     Socioeconomic History    Marital status:    Tobacco Use    Smoking status: Former     Types: Cigarettes    Smokeless tobacco: Never   Vaping Use    Vaping status: Never Used   Substance and Sexual Activity    Alcohol use: Yes     Comment: social    Drug use: Never    Sexual activity: Defer         FAMHX  Family History   Problem Relation Age of Onset    No Known Problems Mother     Heart attack Father     Heart attack Brother     Patel Hyperthermia Neg Hx           ALLERGY  No Known Allergies     MEDSCURRENT    Current Outpatient Medications:     acetaminophen (TYLENOL) 325 MG tablet, Take 2 tablets by mouth Every 4 (Four) Hours As Needed for Mild Pain., Disp: , Rfl:     aspirin 81 MG EC tablet, Take 1 tablet by mouth Daily., Disp: , Rfl:     atorvastatin (LIPITOR) 40 MG tablet, Take 1 tablet by mouth Every Evening., Disp: , Rfl:     D-3-5 125 MCG (5000 UT) capsule capsule, Take 1 capsule by mouth Daily., Disp: , Rfl:     FeroSul 325 (65 Fe) MG tablet, Take 1 tablet by mouth Daily With Breakfast., Disp: , Rfl:     hydrOXYzine (ATARAX) 25 MG tablet, Take 1 tablet by mouth As Needed., Disp: , Rfl:     levothyroxine (SYNTHROID, LEVOTHROID) 50 MCG tablet, Take 1 tablet by mouth Daily., Disp: , Rfl:     melatonin 5 MG tablet tablet, Take 1 tablet by mouth., Disp: , Rfl:     omeprazole (priLOSEC) 40 MG capsule, Take 1 capsule by mouth Every Morning., Disp: , Rfl:       Review of Systems   Cardiovascular:  Positive for chest pain. Negative for palpitations and syncope.   Respiratory:  Positive for shortness of breath.    Neurological:  Positive for dizziness.        Objective     /65   Pulse 81   Ht 167.6 cm (66\")   Wt 70.8 kg (156 lb)   BMI 25.18 kg/m²       General Appearance:   well developed  well nourished  HENT:   oropharynx moist  lips not cyanotic  Neck:  thyroid not enlarged  supple  Respiratory:  no respiratory distress  normal breath sounds  no " rales  Cardiovascular:  no jugular venous distention  regular rhythm  apical impulse normal  S1 normal, S2 normal  no S3, no S4   Soft parasternal systolic murmur  no rub, no thrill  carotid pulses normal; no bruit  pedal pulses normal  lower extremity edema: none    Musculoskeletal:  no clubbing of fingers.   normocephalic, head atraumatic  Skin:   warm, dry  Psychiatric:  judgement and insight appropriate  normal mood and affect      Result Review :     The following data was reviewed by: Peter Hinojosa MD on 07/26/2024:    CMP          8/29/2024    08:28 9/27/2024    11:03 10/16/2024    03:04   CMP   Glucose 90   93    BUN 12   9    Creatinine 0.95  1.00  0.91    EGFR 63.8   67.2    Sodium 142   137    Potassium 4.0   3.7    Chloride 107   106    Calcium 9.7   9.1    BUN/Creatinine Ratio 12.6   9.9    Anion Gap 9.9   8.9      CBC          8/29/2024    08:28 10/15/2024    12:27 10/16/2024    03:04   CBC   WBC 5.46   8.80    RBC 4.27   3.75    Hemoglobin 9.1  10.2  7.8    Hematocrit 31.3  30  27.3    MCV 73.3   72.8    MCH 21.3   20.8    MCHC 29.1   28.6    RDW 16.9   16.4    Platelets 276   253            Data reviewed : Cardiology studies previous echo reviewed showing aortic valve mean gradient of 36 mmHg, peak velocity 4.1M/sec.      Procedures           Assessment and Plan        ASSESSMENT:  Encounter Diagnoses   Name Primary?    Aortic valve stenosis, severe Yes    Chronic diastolic congestive heart failure     S/P TAVR (transcatheter aortic valve replacement)     Hypertension, essential          PLAN:    1.  Severe aortic stenosis status post TAVR-the patient is clinically doing well.  Follow-up echo shows normal valve function.  Continue clinical follow-up.  2.  Essential hypertension-controlled, continue current medical therapy  3.  Chronic diastolic heart failure-likely secondary to previous valvular disease, clinically stable.  4.  Mixed hyperlipidemia-stable, continue statin  therapy    Annual follow-up will be arranged      Patient was given instructions and counseling regarding her condition or for health maintenance advice. Please see specific information pulled into the AVS if appropriate.           Peter Hinojosa MD   3/7/2025  11:05 EST

## 2025-06-20 ENCOUNTER — OFFICE VISIT (OUTPATIENT)
Dept: CARDIOLOGY | Facility: CLINIC | Age: 75
End: 2025-06-20
Payer: MEDICARE

## 2025-06-20 VITALS
WEIGHT: 154 LBS | OXYGEN SATURATION: 96 % | HEIGHT: 66 IN | SYSTOLIC BLOOD PRESSURE: 107 MMHG | BODY MASS INDEX: 24.75 KG/M2 | DIASTOLIC BLOOD PRESSURE: 73 MMHG | HEART RATE: 69 BPM

## 2025-06-20 DIAGNOSIS — I10 HYPERTENSION, ESSENTIAL: ICD-10-CM

## 2025-06-20 DIAGNOSIS — R42 DIZZINESS: ICD-10-CM

## 2025-06-20 DIAGNOSIS — I35.0 AORTIC VALVE STENOSIS, SEVERE: Primary | ICD-10-CM

## 2025-06-20 DIAGNOSIS — Z95.2 S/P TAVR (TRANSCATHETER AORTIC VALVE REPLACEMENT): ICD-10-CM

## 2025-06-20 DIAGNOSIS — I50.32 CHRONIC DIASTOLIC CONGESTIVE HEART FAILURE: ICD-10-CM

## 2025-06-20 DIAGNOSIS — R07.89 CHEST PAIN, ATYPICAL: ICD-10-CM

## 2025-06-20 RX ORDER — RANOLAZINE 500 MG/1
500 TABLET, EXTENDED RELEASE ORAL 2 TIMES DAILY
Qty: 60 TABLET | Refills: 3 | Status: SHIPPED | OUTPATIENT
Start: 2025-06-20

## 2025-06-20 RX ORDER — NITROGLYCERIN 0.4 MG/1
0.4 TABLET SUBLINGUAL
COMMUNITY

## 2025-06-20 RX ORDER — OMEPRAZOLE 40 MG/1
40 CAPSULE, DELAYED RELEASE ORAL EVERY MORNING
Qty: 30 CAPSULE | Refills: 0 | Status: SHIPPED | OUTPATIENT
Start: 2025-06-20

## 2025-06-20 RX ORDER — ATORVASTATIN CALCIUM 40 MG/1
40 TABLET, FILM COATED ORAL EVERY EVENING
Qty: 90 TABLET | Refills: 3 | Status: SHIPPED | OUTPATIENT
Start: 2025-06-20

## 2025-06-20 RX ORDER — CHOLECALCIFEROL (VITAMIN D3) 125 MCG
1 CAPSULE ORAL DAILY
Qty: 30 CAPSULE | Refills: 0 | Status: SHIPPED | OUTPATIENT
Start: 2025-06-20

## 2025-06-20 RX ORDER — LEVOTHYROXINE SODIUM 50 UG/1
50 TABLET ORAL DAILY
Qty: 30 TABLET | Refills: 0 | Status: SHIPPED | OUTPATIENT
Start: 2025-06-20

## 2025-06-20 NOTE — PROGRESS NOTES
Chief Complaint  Hospital Follow Up Visit, Shortness of Breath, Chest Pain, Dizziness, and Fall    Subjective            Michelle Cardona presents to [facility] CARDIOLOGY  History of Present Illness    Ms. Cardona is here for follow-up evaluation and management of severe aortic stenosis status post TAVR, essential hypertension, hyperlipidemia, chronic diastolic heart failure.  She is very hard of hearing, she is accompanied by her family.    She is here today for early follow-up after short stay at [facility].  She presented with chest pain.  She has had intermittent chest pains for some time however family reports that over the last week or so they have become more frequent.  No specific alleviating or aggravating factors.  Her workup at [place] was low risk.  Her troponins were negative.  No evidence of volume overload.  EKG showed sinus rhythm nonspecific ST and T wave changes.  Chest x-ray negative.  She was sent home with as needed nitroglycerin.  She has not had to use that yet.  Family also reports the patient has been under a lot of stress and grief lately.  Of note, cardiac catheterization in August prior to TAVR showed no significant obstructive CAD.  She has had a follow-up echo that has shown normal valve function.      PMH  Past Medical History:   Diagnosis Date    Disease of thyroid gland     GERD (gastroesophageal reflux disease)     Heart murmur          SURGICALHX  Past Surgical History:   Procedure Laterality Date    AORTIC VALVE REPAIR/REPLACEMENT N/A 10/15/2024    Procedure: TRANSFEMORAL TRANSCATHETER AORTIC VALVE REPLACEMENT with intraoperative transthoracic echocardiogram;  Surgeon: Rik Schumacher MD;  Location: [facility];  Service: Cardiothoracic;  Laterality: N/A;    AORTIC VALVE REPAIR/REPLACEMENT N/A 10/15/2024    Procedure: Transfemoral Transcatheter Aortic Valve Replacement with intraoperative transthoracic echocardiogram;  Surgeon: Charmaine  George FLOR MD;  Location: [facility] CVOR;  Service: Cardiovascular;  Laterality: N/A;    CARDIAC CATHETERIZATION      CARDIAC CATHETERIZATION N/A 8/29/2024    Procedure: Left Heart Cath;  Surgeon: BAY Hinojosa MD;  Location: [facility] CATH INVASIVE LOCATION;  Service: Cardiology;  Laterality: N/A;    CARDIAC CATHETERIZATION Left 8/29/2024    Procedure: Cardiac Catheterization/ coronaries ONLY;  Surgeon: BAY Hinojosa MD;  Location: [facility] CATH INVASIVE LOCATION;  Service: Cardiology;  Laterality: Left;    COLONOSCOPY          SOC  Social History     Socioeconomic History    Marital status:    Tobacco Use    Smoking status: Former     Types: Cigarettes    Smokeless tobacco: Never   Vaping Use    Vaping status: Never Used   Substance and Sexual Activity    Alcohol use: Yes     Comment: social    Drug use: Never    Sexual activity: Defer         FAMHX  Family History   Problem Relation Age of Onset    No Known Problems Mother     Heart attack Father     Heart attack Brother     Malig Hyperthermia Neg Hx           ALLERGY  No Known Allergies     MEDSCURRENT    Current Outpatient Medications:     acetaminophen (TYLENOL) 325 MG tablet, Take 2 tablets by mouth Every 4 (Four) Hours As Needed for Mild Pain., Disp: , Rfl:     aspirin 81 MG EC tablet, Take 1 tablet by mouth Daily., Disp: , Rfl:     atorvastatin (LIPITOR) 40 MG tablet, Take 1 tablet by mouth Every Evening., Disp: 90 tablet, Rfl: 3    D-3-5 125 MCG (5000 UT) capsule capsule, Take 1 capsule by mouth Daily., Disp: 30 capsule, Rfl: 0    FeroSul 325 (65 Fe) MG tablet, Take 1 tablet by mouth Daily With Breakfast., Disp: , Rfl:     hydrOXYzine (ATARAX) 25 MG tablet, Take 1 tablet by mouth As Needed., Disp: , Rfl:     levothyroxine (SYNTHROID, LEVOTHROID) 50 MCG tablet, Take 1 tablet by mouth Daily., Disp: 30 tablet, Rfl: 0    melatonin 5 MG tablet tablet, Take 1 tablet by mouth., Disp: , Rfl:     nitroglycerin (NITROSTAT) 0.4 MG SL tablet, Place 1 tablet under the  "tongue Every 5 (Five) Minutes As Needed for Chest Pain. Take no more than 3 doses in 15 minutes., Disp: , Rfl:     omeprazole (priLOSEC) 40 MG capsule, Take 1 capsule by mouth Every Morning., Disp: 30 capsule, Rfl: 0    ranolazine (Ranexa) 500 MG 12 hr tablet, Take 1 tablet by mouth 2 (Two) Times a Day., Disp: 60 tablet, Rfl: 3      Review of Systems   Cardiovascular:  Positive for chest pain. Negative for dyspnea on exertion and palpitations.   Neurological:  Positive for dizziness.        Objective     /73   Pulse 69   Ht 167.6 cm (66\")   Wt 69.9 kg (154 lb)   SpO2 96%   BMI 24.86 kg/m²       General Appearance:   well developed  well nourished  HENT:   oropharynx moist  lips not cyanotic  Neck:  thyroid not enlarged  supple  Respiratory:  no respiratory distress  normal breath sounds  no rales  Cardiovascular:  no jugular venous distention  regular rhythm  apical impulse normal  S1 normal, S2 normal  no S3, no S4   no murmur  no rub, no thrill  carotid pulses normal; no bruit  pedal pulses normal  lower extremity edema: none    Musculoskeletal:  no clubbing of fingers.   normocephalic, head atraumatic  Skin:   warm, dry  Psychiatric:  judgement and insight appropriate  normal mood and affect      Result Review :     The following data was reviewed by: AMANDA Rebolledo on 06/20/2025:    CMP          8/29/2024    08:28 9/27/2024    11:03 10/16/2024    03:04   CMP   Glucose 90   93    BUN 12   9    Creatinine 0.95  1.00  0.91    EGFR 63.8   67.2    Sodium 142   137    Potassium 4.0   3.7    Chloride 107   106    Calcium 9.7   9.1    BUN/Creatinine Ratio 12.6   9.9    Anion Gap 9.9   8.9      CBC          8/29/2024    08:28 10/15/2024    12:27 10/16/2024    03:04   CBC   WBC 5.46   8.80    RBC 4.27   3.75    Hemoglobin 9.1  10.2  7.8    Hematocrit 31.3  30  27.3    MCV 73.3   72.8    MCH 21.3   20.8    MCHC 29.1   28.6    RDW 16.9   16.4    Platelets 276   253            Data reviewed : " Cardiology studies Hospital records reviewed.     Procedures      Michelle Cardona  reports that she has quit smoking. Her smoking use included cigarettes. She has never used smokeless tobacco. I have educated her on the risk of diseases from using tobacco products such as cancer, COPD, and heart disease.                Assessment and Plan        ASSESSMENT:  Encounter Diagnoses   Name Primary?    Aortic valve stenosis, severe Yes    S/P TAVR (transcatheter aortic valve replacement)     Hypertension, essential     Chronic diastolic congestive heart failure     Dizziness     Chest pain, atypical          PLAN:    1.  Chest pain, increased over the last week or so.  No evidence of ACS during recent hospitalization.  Cardiac catheterization last summer showed mild nonobstructive coronary artery disease.  Apparently her pain was responsive to nitro while hospitalized.  She was sent home with a prescription of that but has not needed to take.  Of note she also has intermittent dizziness due to low blood pressure.  I do not think she could handle a low-dose Imdur however will trial Ranexa 500 mg twice daily.  She will have nitro to use on hand if needed.  2.  Severe aortic stenosis status post TAVR, follow-up echo shows normal valve function.   3.  Essential hypertension, as above.  4.  Chronic diastolic heart failure, volume status is stable.  Continue current medical therapy.  5.  Mixed hyperlipidemia stable statin therapy, continue.    Close follow-up arranged.      Patient was given instructions and counseling regarding her condition or for health maintenance advice. Please see specific information pulled into the AVS if appropriate.           Glo Goff, AMANDA   6/20/2025  09:55 EDT

## 2025-06-23 ENCOUNTER — TELEPHONE (OUTPATIENT)
Dept: CARDIOLOGY | Facility: CLINIC | Age: 75
End: 2025-06-23
Payer: MEDICARE

## 2025-06-23 NOTE — TELEPHONE ENCOUNTER
PA FOR RANOLAZINE INITIATED, KEY [id number].    SW pt's daughter, Kyle Contreras, who is on the verbal.  Let her know of the req'd PA.     Approved today by [employer] 2017  PA Case: [id number], Status: Approved, Coverage Starts on: 3/24/2025 12:00:00 AM, Coverage Ends on: 6/23/2026 12:00:00 AM.    LVM on daughter's phone to let them know it had been approved.

## 2025-07-25 ENCOUNTER — OFFICE VISIT (OUTPATIENT)
Dept: CARDIOLOGY | Facility: CLINIC | Age: 75
End: 2025-07-25
Payer: MEDICARE

## 2025-07-25 VITALS
DIASTOLIC BLOOD PRESSURE: 74 MMHG | OXYGEN SATURATION: 95 % | HEIGHT: 66 IN | BODY MASS INDEX: 24.2 KG/M2 | SYSTOLIC BLOOD PRESSURE: 112 MMHG | WEIGHT: 150.6 LBS | HEART RATE: 70 BPM

## 2025-07-25 DIAGNOSIS — Z95.2 S/P TAVR (TRANSCATHETER AORTIC VALVE REPLACEMENT): ICD-10-CM

## 2025-07-25 DIAGNOSIS — I35.0 AORTIC VALVE STENOSIS, SEVERE: Primary | ICD-10-CM

## 2025-07-25 DIAGNOSIS — I10 HYPERTENSION, ESSENTIAL: ICD-10-CM

## 2025-07-25 DIAGNOSIS — R07.89 CHEST PAIN, ATYPICAL: ICD-10-CM

## 2025-07-25 DIAGNOSIS — I50.32 CHRONIC DIASTOLIC CONGESTIVE HEART FAILURE: ICD-10-CM

## 2025-07-25 RX ORDER — ESCITALOPRAM OXALATE 5 MG/1
5 TABLET ORAL DAILY
COMMUNITY

## 2025-07-25 NOTE — PROGRESS NOTES
"Chief Complaint  Follow-up (1 month )    Subjective            Michelle Cardona presents to [facility] CARDIOLOGY  History of Present Illness    Ms. Cardona is here for follow-up evaluation and management of severe aortic valve stenosis status post TAVR, hypertension, hyperlipidemia, chronic diastolic heart failure.  She continues to have some atypical chest pains, interestingly they only occur at nighttime.  She reports that she lays down and begins to \" daydream\" and tosses and turns.  Her family present thinks that her anxiety has worsened.  She was recently started on Lexapro by PCP.  Symptoms are some better.  She has no chest pain or exertional shortness of breath during the day.    Of note, cardiac catheterization in August prior to TAVR showed no significant obstructive CAD.  She was briefly hospitalized at [place] due to her symptoms recently and was started on sublingual nitro as needed.    She is very hard of hearing and accompanied by her family today.    PMH  Past Medical History:   Diagnosis Date    Disease of thyroid gland     GERD (gastroesophageal reflux disease)     Heart murmur          SURGICALHX  Past Surgical History:   Procedure Laterality Date    AORTIC VALVE REPAIR/REPLACEMENT N/A 10/15/2024    Procedure: TRANSFEMORAL TRANSCATHETER AORTIC VALVE REPLACEMENT with intraoperative transthoracic echocardiogram;  Surgeon: Rik Schumacher MD;  Location: [facility];  Service: Cardiothoracic;  Laterality: N/A;    AORTIC VALVE REPAIR/REPLACEMENT N/A 10/15/2024    Procedure: Transfemoral Transcatheter Aortic Valve Replacement with intraoperative transthoracic echocardiogram;  Surgeon: George Montano MD;  Location: [facility];  Service: Cardiovascular;  Laterality: N/A;    CARDIAC CATHETERIZATION      CARDIAC CATHETERIZATION N/A 8/29/2024    Procedure: Left Heart Cath;  Surgeon: BAY Hinojosa MD;  Location: [facility] CATH INVASIVE LOC[facility];  Service: Cardiology;  " Laterality: N/A;    CARDIAC CATHETERIZATION Left 8/29/2024    Procedure: Cardiac Catheterization/ coronaries ONLY;  Surgeon: BAY Hinojosa MD;  Location: [facility] CATH INVASIVE LOCATION;  Service: Cardiology;  Laterality: Left;    COLONOSCOPY          SOC  Social History     Socioeconomic History    Marital status:    Tobacco Use    Smoking status: Former     Types: Cigarettes    Smokeless tobacco: Never   Vaping Use    Vaping status: Never Used   Substance and Sexual Activity    Alcohol use: Yes     Comment: social    Drug use: Never    Sexual activity: Defer         FAMHX  Family History   Problem Relation Age of Onset    No Known Problems Mother     Heart attack Father     Heart attack Brother     Patel Hyperthermia Neg Hx           ALLERGY  No Known Allergies     MEDSCURRENT    Current Outpatient Medications:     acetaminophen (TYLENOL) 325 MG tablet, Take 2 tablets by mouth Every 4 (Four) Hours As Needed for Mild Pain., Disp: , Rfl:     aspirin 81 MG EC tablet, Take 1 tablet by mouth Daily., Disp: , Rfl:     atorvastatin (LIPITOR) 40 MG tablet, Take 1 tablet by mouth Every Evening., Disp: 90 tablet, Rfl: 3    D-3-5 125 MCG (5000 UT) capsule capsule, Take 1 capsule by mouth Daily., Disp: 30 capsule, Rfl: 0    escitalopram (LEXAPRO) 5 MG tablet, Take 1 tablet by mouth Daily., Disp: , Rfl:     FeroSul 325 (65 Fe) MG tablet, Take 1 tablet by mouth Daily With Breakfast., Disp: , Rfl:     hydrOXYzine (ATARAX) 25 MG tablet, Take 1 tablet by mouth As Needed., Disp: , Rfl:     levothyroxine (SYNTHROID, LEVOTHROID) 50 MCG tablet, Take 1 tablet by mouth Daily., Disp: 30 tablet, Rfl: 0    melatonin 5 MG tablet tablet, Take 1 tablet by mouth., Disp: , Rfl:     nitroglycerin (NITROSTAT) 0.4 MG SL tablet, Place 1 tablet under the tongue Every 5 (Five) Minutes As Needed for Chest Pain. Take no more than 3 doses in 15 minutes., Disp: , Rfl:     omeprazole (priLOSEC) 40 MG capsule, Take 1 capsule by mouth Every Morning.,  "Disp: 30 capsule, Rfl: 0    ranolazine (Ranexa) 500 MG 12 hr tablet, Take 1 tablet by mouth 2 (Two) Times a Day., Disp: 60 tablet, Rfl: 3      Review of Systems   Cardiovascular:  Positive for chest pain and dyspnea on exertion. Negative for palpitations and syncope.        Objective     /74   Pulse 70   Ht 167.6 cm (66\")   Wt 68.3 kg (150 lb 9.6 oz)   SpO2 95%   BMI 24.31 kg/m²       General Appearance:   well developed  well nourished  HENT:   oropharynx moist  lips not cyanotic  Neck:  thyroid not enlarged  supple  Respiratory:  no respiratory distress  normal breath sounds  no rales  Cardiovascular:  no jugular venous distention  regular rhythm  apical impulse normal  S1 normal, S2 normal  no S3, no S4   no murmur  no rub, no thrill  carotid pulses normal; no bruit  pedal pulses normal  lower extremity edema: none    Musculoskeletal:  no clubbing of fingers.   normocephalic, head atraumatic  Skin:   warm, dry  Psychiatric:  judgement and insight appropriate  normal mood and affect      Result Review :     The following data was reviewed by: AMANDA Rebolledo on 07/25/2025:    CMP          8/29/2024    08:28 9/27/2024    11:03 10/16/2024    03:04   CMP   Glucose 90   93    BUN 12   9    Creatinine 0.95  1.00  0.91    EGFR 63.8   67.2    Sodium 142   137    Potassium 4.0   3.7    Chloride 107   106    Calcium 9.7   9.1    BUN/Creatinine Ratio 12.6   9.9    Anion Gap 9.9   8.9      CBC          8/29/2024    08:28 10/15/2024    12:27 10/16/2024    03:04   CBC   WBC 5.46   8.80    RBC 4.27   3.75    Hemoglobin 9.1  10.2  7.8    Hematocrit 31.3  30  27.3    MCV 73.3   72.8    MCH 21.3   20.8    MCHC 29.1   28.6    RDW 16.9   16.4    Platelets 276   253                 Procedures      Michelle Cardona  reports that she has quit smoking. Her smoking use included cigarettes. She has never used smokeless tobacco. I have educated her on the risk of diseases from using tobacco products such as " cancer, COPD, and heart disease.              Assessment and Plan        ASSESSMENT:  Encounter Diagnoses   Name Primary?    Aortic valve stenosis, severe Yes    S/P TAVR (transcatheter aortic valve replacement)     Hypertension, essential     Chronic diastolic congestive heart failure     Chest pain, atypical          PLAN:    1.  Atypical chest pain, was responsive to nitro in the hospital.  She is on Ranexa now.  Her pains are only at nighttime and she and family feel they may be anxiety triggered.  Will continue current medical therapy and also encouraged her to continue anxiolytics recently started.  If chest pain progresses, they will notify the office otherwise we will take a watchful waiting approach.  Cardiac catheterization last year showed no significant CAD.  2.  Severe aortic valve stenosis, status post TAVR, normal valve function on most recent echo.  Follow-up scheduled with CT surgery in September.  3.  Essential hypertension controlled, continue current medical therapy.   4.  Chronic diastolic heart failure, volume status is stable.  Continue current medical therapy.  5.  Mixed hyperlipidemia stable on statin therapy, continue.    Follow-up as scheduled next year.        Patient was given instructions and counseling regarding her condition or for health maintenance advice. Please see specific information pulled into the AVS if appropriate.           Glo Goff, APRN   7/25/2025  08:45 EDT

## (undated) DEVICE — SHORT SPIKE VENTED W/3-WAY SC: Brand: MEDLINE INDUSTRIES, INC.

## (undated) DEVICE — GW AMPLTZ XSTIF STR .025IN 260CM

## (undated) DEVICE — CATH DIAG IMPULSE AL1 6F 100CM

## (undated) DEVICE — SUT SILK 2 SUTUPAK TIE 60IN SA8H 2STRAND

## (undated) DEVICE — GLV SURG BIOGEL LTX PF 8

## (undated) DEVICE — DECANTER BAG 9": Brand: MEDLINE INDUSTRIES, INC.

## (undated) DEVICE — SOL NACL 0.9PCT 1000ML

## (undated) DEVICE — GLV SURG BIOGEL LTX PF 7 1/2

## (undated) DEVICE — DRSNG WND GZ PAD BORDERED 4X8IN STRL

## (undated) DEVICE — SPONGE,LAP,18"X18",DLX,XR,ST,5/PK,40/PK: Brand: MEDLINE

## (undated) DEVICE — DRP INCISE CARDIO W/ADHS 115X85X151IN LG STRL

## (undated) DEVICE — INTRO SHEATH ART/FEM ENGAGE .035 6F12CM

## (undated) DEVICE — Device

## (undated) DEVICE — CVR PROB 96IN LF STRL

## (undated) DEVICE — TBG INJ CONTRL PVCCLR RIGD RA 1200PSI 10

## (undated) DEVICE — CONTAINER,SPECIMEN,OR STERILE,4OZ: Brand: MEDLINE

## (undated) DEVICE — DISPOSABLE ADAPTER

## (undated) DEVICE — TOWEL,OR,DSP,ST,BLUE,STD,4/PK,20PK/CS: Brand: MEDLINE

## (undated) DEVICE — 3M™ TEGADERM™ CHG DRESSING 25/CARTON 4 CARTONS/CASE 1658: Brand: TEGADERM™

## (undated) DEVICE — CVR HNDL LT SURG ACCSSRY BLU STRL

## (undated) DEVICE — CATH F5 INF JR 4 100CM: Brand: INFINITI

## (undated) DEVICE — TRY INTRO PERC 6F

## (undated) DEVICE — KT MANIFLD CARDIAC

## (undated) DEVICE — DGW .035 FC J3MM 260CM TEF: Brand: EMERALD

## (undated) DEVICE — 3M™ IOBAN™ 2 ANTIMICROBIAL INCISE DRAPE 6650EZ: Brand: IOBAN™ 2

## (undated) DEVICE — SPNG GZ WOVN 4X4IN 12PLY 10/BX STRL

## (undated) DEVICE — DRSNG SURESITE WNDW 2.38X2.75

## (undated) DEVICE — GLIDESHEATH SLENDER ACCESS KIT: Brand: GLIDESHEATH SLENDER

## (undated) DEVICE — SNAP KAP: Brand: UNBRANDED

## (undated) DEVICE — GW INQWIRE FC PTFE STR .035IN 150

## (undated) DEVICE — SOL IRR NACL 0.9PCT BT 1000ML

## (undated) DEVICE — Device: Brand: PROWATER

## (undated) DEVICE — PERCLOSE™ PROSTYLE™ SUTURE-MEDIATED CLOSURE AND REPAIR SYSTEM: Brand: PERCLOSE™ PROSTYLE™

## (undated) DEVICE — RADIFOCUS OPTITORQUE ANGIOGRAPHIC CATHETER: Brand: OPTITORQUE

## (undated) DEVICE — SUT GORE TT13 CV5 5N02A

## (undated) DEVICE — LABEL SHEET CUSTOM 2X2 YELLOW: Brand: MEDLINE INDUSTRIES, INC.

## (undated) DEVICE — TRUE™ DILATATION BALLOON VALVULOPLASTY CATHETER, 20 MM X 4.5 CM, 110 CM CATHETER: Brand: TRUE DILATATION

## (undated) DEVICE — SOL NS 500ML

## (undated) DEVICE — DRAPE,REIN 53X77,STERILE: Brand: MEDLINE

## (undated) DEVICE — SENSR CERBRL O2 PK/2

## (undated) DEVICE — CATH F5 INF PIG145 110CM 6SH: Brand: INFINITI

## (undated) DEVICE — RADIFOCUS GLIDEWIRE: Brand: GLIDEWIRE

## (undated) DEVICE — SOL IRR H2O BTL 1000ML STRL

## (undated) DEVICE — TAVR: Brand: MEDLINE INDUSTRIES, INC.

## (undated) DEVICE — TBG PRESS 96IN M/F ROT BRAID: Brand: MEDLINE INDUSTRIES, INC.

## (undated) DEVICE — SOL ISO/ALC 70PCT 4OZ

## (undated) DEVICE — TR BAND RADIAL ARTERY COMPRESSION DEVICE: Brand: TR BAND

## (undated) DEVICE — EQUIPMENT COVER BAG TYPE 48” X 36” (122CM X 91CM): Brand: EQUIPMENT COVER BAG TYPE

## (undated) DEVICE — NO DESCRIPTION: Brand: SENTINEL

## (undated) DEVICE — 3M™ BAIR HUGGER® UNDERBODY BLANKET, FULL ACCESS, 10 PER CASE 63500: Brand: BAIR HUGGER™

## (undated) DEVICE — ST. SORBAVIEW ULTIMATE IJ SYSTEM A,C: Brand: CENTURION